# Patient Record
Sex: FEMALE | Race: WHITE | NOT HISPANIC OR LATINO | Employment: OTHER | URBAN - METROPOLITAN AREA
[De-identification: names, ages, dates, MRNs, and addresses within clinical notes are randomized per-mention and may not be internally consistent; named-entity substitution may affect disease eponyms.]

---

## 2017-02-22 ENCOUNTER — OFFICE VISIT - HEALTHEAST (OUTPATIENT)
Dept: INTERNAL MEDICINE | Facility: CLINIC | Age: 72
End: 2017-02-22

## 2017-02-22 DIAGNOSIS — Z00.00 PREVENTATIVE HEALTH CARE: ICD-10-CM

## 2017-02-22 DIAGNOSIS — J32.9 SINUSITIS: ICD-10-CM

## 2017-02-24 ENCOUNTER — RECORDS - HEALTHEAST (OUTPATIENT)
Dept: ADMINISTRATIVE | Facility: OTHER | Age: 72
End: 2017-02-24

## 2017-03-31 ENCOUNTER — OFFICE VISIT - HEALTHEAST (OUTPATIENT)
Dept: INTERNAL MEDICINE | Facility: CLINIC | Age: 72
End: 2017-03-31

## 2017-03-31 DIAGNOSIS — M89.9 DISORDER OF BONE AND CARTILAGE: ICD-10-CM

## 2017-03-31 DIAGNOSIS — E78.00 HYPERCHOLESTEROLEMIA: ICD-10-CM

## 2017-03-31 DIAGNOSIS — M94.9 DISORDER OF BONE AND CARTILAGE: ICD-10-CM

## 2017-03-31 DIAGNOSIS — Z00.00 PREVENTATIVE HEALTH CARE: ICD-10-CM

## 2017-03-31 DIAGNOSIS — C92.10 CHRONIC MYELOID LEUKEMIA (H): ICD-10-CM

## 2017-03-31 LAB
CHOLEST SERPL-MCNC: 195 MG/DL
FASTING STATUS PATIENT QL REPORTED: YES
HDLC SERPL-MCNC: 70 MG/DL
LDLC SERPL CALC-MCNC: 100 MG/DL
TRIGL SERPL-MCNC: 126 MG/DL

## 2017-03-31 ASSESSMENT — MIFFLIN-ST. JEOR: SCORE: 972.72

## 2017-04-03 ENCOUNTER — COMMUNICATION - HEALTHEAST (OUTPATIENT)
Dept: INTERNAL MEDICINE | Facility: CLINIC | Age: 72
End: 2017-04-03

## 2017-04-05 ENCOUNTER — RECORDS - HEALTHEAST (OUTPATIENT)
Dept: ADMINISTRATIVE | Facility: OTHER | Age: 72
End: 2017-04-05

## 2017-04-05 ENCOUNTER — RECORDS - HEALTHEAST (OUTPATIENT)
Dept: BONE DENSITY | Facility: CLINIC | Age: 72
End: 2017-04-05

## 2017-04-05 DIAGNOSIS — Z78.0 ASYMPTOMATIC MENOPAUSAL STATE: ICD-10-CM

## 2017-04-05 DIAGNOSIS — Z13.820 ENCOUNTER FOR SCREENING FOR OSTEOPOROSIS: ICD-10-CM

## 2017-04-12 ENCOUNTER — COMMUNICATION - HEALTHEAST (OUTPATIENT)
Dept: INTERNAL MEDICINE | Facility: CLINIC | Age: 72
End: 2017-04-12

## 2017-05-08 ENCOUNTER — COMMUNICATION - HEALTHEAST (OUTPATIENT)
Dept: INTERNAL MEDICINE | Facility: CLINIC | Age: 72
End: 2017-05-08

## 2017-05-19 ENCOUNTER — COMMUNICATION - HEALTHEAST (OUTPATIENT)
Dept: INTERNAL MEDICINE | Facility: CLINIC | Age: 72
End: 2017-05-19

## 2017-05-23 ENCOUNTER — AMBULATORY - HEALTHEAST (OUTPATIENT)
Dept: INTERNAL MEDICINE | Facility: CLINIC | Age: 72
End: 2017-05-23

## 2017-05-23 DIAGNOSIS — E01.0 THYROMEGALY: ICD-10-CM

## 2017-05-25 ENCOUNTER — HOSPITAL ENCOUNTER (OUTPATIENT)
Dept: ULTRASOUND IMAGING | Facility: CLINIC | Age: 72
Discharge: HOME | End: 2017-05-25
Attending: INTERNAL MEDICINE

## 2017-05-25 DIAGNOSIS — E01.0 THYROMEGALY: ICD-10-CM

## 2017-05-25 DIAGNOSIS — E04.9 NONTOXIC GOITER, UNSPECIFIED: ICD-10-CM

## 2017-06-08 ENCOUNTER — HOSPITAL ENCOUNTER (OUTPATIENT)
Dept: ULTRASOUND IMAGING | Facility: CLINIC | Age: 72
Discharge: HOME | End: 2017-06-08
Attending: INTERNAL MEDICINE

## 2017-06-08 DIAGNOSIS — E04.1 THYROID NODULE: ICD-10-CM

## 2017-06-09 ENCOUNTER — COMMUNICATION - HEALTHEAST (OUTPATIENT)
Dept: INTERNAL MEDICINE | Facility: CLINIC | Age: 72
End: 2017-06-09

## 2017-06-09 LAB
LAB AP CHARGES (HE HISTORICAL CONVERSION): NORMAL
LAB AP INITIAL CYTO EVAL (HE HISTORICAL CONVERSION): NORMAL
LAB MED GENERAL PATH INTERP (HE HISTORICAL CONVERSION): NORMAL
PATH REPORT.COMMENTS IMP SPEC: NORMAL
PATH REPORT.FINAL DX SPEC: NORMAL
PATH REPORT.MICROSCOPIC SPEC OTHER STN: NORMAL
PATH REPORT.RELEVANT HX SPEC: NORMAL
SPECIMEN DESCRIPTION: NORMAL

## 2017-11-15 ENCOUNTER — AMBULATORY - HEALTHEAST (OUTPATIENT)
Dept: NURSING | Facility: CLINIC | Age: 72
End: 2017-11-15

## 2017-11-15 DIAGNOSIS — Z23 NEEDS FLU SHOT: ICD-10-CM

## 2017-12-06 ENCOUNTER — RECORDS - HEALTHEAST (OUTPATIENT)
Dept: ADMINISTRATIVE | Facility: OTHER | Age: 72
End: 2017-12-06

## 2018-02-15 ENCOUNTER — AMBULATORY - HEALTHEAST (OUTPATIENT)
Dept: NEUROSURGERY | Facility: CLINIC | Age: 73
End: 2018-02-15

## 2018-02-15 ENCOUNTER — COMMUNICATION - HEALTHEAST (OUTPATIENT)
Dept: NEUROSURGERY | Facility: CLINIC | Age: 73
End: 2018-02-15

## 2018-02-15 DIAGNOSIS — D32.9 MENINGIOMA (H): ICD-10-CM

## 2018-02-19 ENCOUNTER — COMMUNICATION - HEALTHEAST (OUTPATIENT)
Dept: INTERNAL MEDICINE | Facility: CLINIC | Age: 73
End: 2018-02-19

## 2018-02-19 DIAGNOSIS — Z00.00 PREVENTATIVE HEALTH CARE: ICD-10-CM

## 2018-02-19 DIAGNOSIS — Z12.31 ENCOUNTER FOR SCREENING MAMMOGRAM FOR MALIGNANT NEOPLASM OF BREAST: ICD-10-CM

## 2018-02-22 ENCOUNTER — RECORDS - HEALTHEAST (OUTPATIENT)
Dept: ADMINISTRATIVE | Facility: OTHER | Age: 73
End: 2018-02-22

## 2018-02-26 ENCOUNTER — RECORDS - HEALTHEAST (OUTPATIENT)
Dept: ADMINISTRATIVE | Facility: OTHER | Age: 73
End: 2018-02-26

## 2018-03-07 ENCOUNTER — COMMUNICATION - HEALTHEAST (OUTPATIENT)
Dept: NEUROSURGERY | Facility: CLINIC | Age: 73
End: 2018-03-07

## 2018-03-07 DIAGNOSIS — D32.9 MENINGIOMA (H): ICD-10-CM

## 2018-03-21 ENCOUNTER — COMMUNICATION - HEALTHEAST (OUTPATIENT)
Dept: INTERNAL MEDICINE | Facility: CLINIC | Age: 73
End: 2018-03-21

## 2018-03-21 DIAGNOSIS — E55.9 VITAMIN D DEFICIENCY: ICD-10-CM

## 2018-03-21 DIAGNOSIS — Z00.00 PREVENTATIVE HEALTH CARE: ICD-10-CM

## 2018-03-21 DIAGNOSIS — C92.10: ICD-10-CM

## 2018-03-22 ENCOUNTER — RECORDS - HEALTHEAST (OUTPATIENT)
Dept: ADMINISTRATIVE | Facility: OTHER | Age: 73
End: 2018-03-22

## 2018-03-26 ENCOUNTER — AMBULATORY - HEALTHEAST (OUTPATIENT)
Dept: LAB | Facility: CLINIC | Age: 73
End: 2018-03-26

## 2018-03-26 DIAGNOSIS — C92.10: ICD-10-CM

## 2018-03-26 DIAGNOSIS — Z00.00 PREVENTATIVE HEALTH CARE: ICD-10-CM

## 2018-03-26 DIAGNOSIS — E55.9 VITAMIN D DEFICIENCY: ICD-10-CM

## 2018-03-26 LAB
ALBUMIN SERPL-MCNC: 3.8 G/DL (ref 3.5–5)
ALP SERPL-CCNC: 53 U/L (ref 45–120)
ALT SERPL W P-5'-P-CCNC: 15 U/L (ref 0–45)
ANION GAP SERPL CALCULATED.3IONS-SCNC: 10 MMOL/L (ref 5–18)
AST SERPL W P-5'-P-CCNC: 24 U/L (ref 0–40)
BASOPHILS # BLD AUTO: 0 THOU/UL (ref 0–0.2)
BASOPHILS NFR BLD AUTO: 1 % (ref 0–2)
BILIRUB SERPL-MCNC: 0.7 MG/DL (ref 0–1)
BUN SERPL-MCNC: 12 MG/DL (ref 8–28)
CALCIUM SERPL-MCNC: 9.2 MG/DL (ref 8.5–10.5)
CHLORIDE BLD-SCNC: 107 MMOL/L (ref 98–107)
CHOLEST SERPL-MCNC: 211 MG/DL
CO2 SERPL-SCNC: 25 MMOL/L (ref 22–31)
CREAT SERPL-MCNC: 0.94 MG/DL (ref 0.6–1.1)
EOSINOPHIL # BLD AUTO: 0.2 THOU/UL (ref 0–0.4)
EOSINOPHIL NFR BLD AUTO: 5 % (ref 0–6)
ERYTHROCYTE [DISTWIDTH] IN BLOOD BY AUTOMATED COUNT: 11.7 % (ref 11–14.5)
FASTING STATUS PATIENT QL REPORTED: YES
GFR SERPL CREATININE-BSD FRML MDRD: 59 ML/MIN/1.73M2
GLUCOSE BLD-MCNC: 104 MG/DL (ref 70–125)
HCT VFR BLD AUTO: 38.3 % (ref 35–47)
HDLC SERPL-MCNC: 72 MG/DL
HGB BLD-MCNC: 12.6 G/DL (ref 12–16)
LDLC SERPL CALC-MCNC: 120 MG/DL
LYMPHOCYTES # BLD AUTO: 1.5 THOU/UL (ref 0.8–4.4)
LYMPHOCYTES NFR BLD AUTO: 32 % (ref 20–40)
MCH RBC QN AUTO: 31.7 PG (ref 27–34)
MCHC RBC AUTO-ENTMCNC: 32.9 G/DL (ref 32–36)
MCV RBC AUTO: 96 FL (ref 80–100)
MONOCYTES # BLD AUTO: 0.4 THOU/UL (ref 0–0.9)
MONOCYTES NFR BLD AUTO: 9 % (ref 2–10)
NEUTROPHILS # BLD AUTO: 2.4 THOU/UL (ref 2–7.7)
NEUTROPHILS NFR BLD AUTO: 53 % (ref 50–70)
PLATELET # BLD AUTO: 240 THOU/UL (ref 140–440)
PMV BLD AUTO: 7.4 FL (ref 7–10)
POTASSIUM BLD-SCNC: 4.8 MMOL/L (ref 3.5–5)
PROT SERPL-MCNC: 6.3 G/DL (ref 6–8)
RBC # BLD AUTO: 3.98 MILL/UL (ref 3.8–5.4)
SODIUM SERPL-SCNC: 142 MMOL/L (ref 136–145)
TRIGL SERPL-MCNC: 95 MG/DL
WBC: 4.6 THOU/UL (ref 4–11)

## 2018-03-27 LAB
25(OH)D3 SERPL-MCNC: 47.1 NG/ML (ref 30–80)
25(OH)D3 SERPL-MCNC: 47.1 NG/ML (ref 30–80)

## 2018-04-11 ENCOUNTER — COMMUNICATION - HEALTHEAST (OUTPATIENT)
Dept: INTERNAL MEDICINE | Facility: CLINIC | Age: 73
End: 2018-04-11

## 2018-04-11 ENCOUNTER — OFFICE VISIT - HEALTHEAST (OUTPATIENT)
Dept: INTERNAL MEDICINE | Facility: CLINIC | Age: 73
End: 2018-04-11

## 2018-04-11 DIAGNOSIS — C92.10 CHRONIC MYELOID LEUKEMIA (H): ICD-10-CM

## 2018-04-11 DIAGNOSIS — Z00.00 PREVENTATIVE HEALTH CARE: ICD-10-CM

## 2018-04-11 DIAGNOSIS — D12.6 BENIGN NEOPLASM OF COLON: ICD-10-CM

## 2018-04-11 DIAGNOSIS — M89.9 DISORDER OF BONE AND CARTILAGE: ICD-10-CM

## 2018-04-11 DIAGNOSIS — M94.9 DISORDER OF BONE AND CARTILAGE: ICD-10-CM

## 2018-04-11 DIAGNOSIS — E04.2 MULTIPLE THYROID NODULES: ICD-10-CM

## 2018-04-11 DIAGNOSIS — C50.919 MALIGNANT NEOPLASM OF FEMALE BREAST (H): ICD-10-CM

## 2018-04-11 ASSESSMENT — MIFFLIN-ST. JEOR: SCORE: 979.53

## 2018-04-18 ENCOUNTER — HOSPITAL ENCOUNTER (OUTPATIENT)
Dept: ULTRASOUND IMAGING | Facility: CLINIC | Age: 73
Discharge: HOME | End: 2018-04-18
Attending: INTERNAL MEDICINE

## 2018-04-18 DIAGNOSIS — E04.2 MULTIPLE THYROID NODULES: ICD-10-CM

## 2018-05-20 ENCOUNTER — COMMUNICATION - HEALTHEAST (OUTPATIENT)
Dept: INTERNAL MEDICINE | Facility: CLINIC | Age: 73
End: 2018-05-20

## 2018-08-22 ENCOUNTER — COMMUNICATION - HEALTHEAST (OUTPATIENT)
Dept: INTERNAL MEDICINE | Facility: CLINIC | Age: 73
End: 2018-08-22

## 2018-08-22 ENCOUNTER — OFFICE VISIT - HEALTHEAST (OUTPATIENT)
Dept: INTERNAL MEDICINE | Facility: CLINIC | Age: 73
End: 2018-08-22

## 2018-08-22 DIAGNOSIS — N89.8 VAGINAL IRRITATION: ICD-10-CM

## 2018-08-22 DIAGNOSIS — N95.2 POSTMENOPAUSAL ATROPHIC VAGINITIS: ICD-10-CM

## 2018-08-22 LAB
ALBUMIN UR-MCNC: NEGATIVE MG/DL
APPEARANCE UR: CLEAR
BILIRUB UR QL STRIP: NEGATIVE
CLUE CELLS: NORMAL
COLOR UR AUTO: YELLOW
GLUCOSE UR STRIP-MCNC: NEGATIVE MG/DL
HGB UR QL STRIP: NEGATIVE
KETONES UR STRIP-MCNC: ABNORMAL MG/DL
LEUKOCYTE ESTERASE UR QL STRIP: NEGATIVE
NITRATE UR QL: NEGATIVE
PH UR STRIP: 6.5 [PH] (ref 5–8)
SP GR UR STRIP: 1.02 (ref 1–1.03)
TRICHOMONAS, WET PREP: NORMAL
UROBILINOGEN UR STRIP-ACNC: ABNORMAL
YEAST, WET PREP: NORMAL

## 2018-09-19 ENCOUNTER — COMMUNICATION - HEALTHEAST (OUTPATIENT)
Dept: INTERNAL MEDICINE | Facility: CLINIC | Age: 73
End: 2018-09-19

## 2018-10-01 ENCOUNTER — OFFICE VISIT - HEALTHEAST (OUTPATIENT)
Dept: FAMILY MEDICINE | Facility: CLINIC | Age: 73
End: 2018-10-01

## 2018-10-01 ENCOUNTER — COMMUNICATION - HEALTHEAST (OUTPATIENT)
Dept: SCHEDULING | Facility: CLINIC | Age: 73
End: 2018-10-01

## 2018-10-01 DIAGNOSIS — R42 DIZZINESS: ICD-10-CM

## 2018-10-01 LAB
ANION GAP SERPL CALCULATED.3IONS-SCNC: 10 MMOL/L (ref 5–18)
BASOPHILS # BLD AUTO: 0 THOU/UL (ref 0–0.2)
BASOPHILS NFR BLD AUTO: 1 % (ref 0–2)
BUN SERPL-MCNC: 13 MG/DL (ref 8–28)
CHLORIDE BLD-SCNC: 103 MMOL/L (ref 98–107)
CO2 SERPL-SCNC: 27 MMOL/L (ref 22–31)
CREAT SERPL-MCNC: 1 MG/DL (ref 0.7–1.3)
EOSINOPHIL # BLD AUTO: 0.3 THOU/UL (ref 0–0.4)
EOSINOPHIL NFR BLD AUTO: 4 % (ref 0–6)
ERYTHROCYTE [DISTWIDTH] IN BLOOD BY AUTOMATED COUNT: 12.4 % (ref 11–14.5)
GLUCOSE BLD-MCNC: 103 MG/DL (ref 70–125)
HCT VFR BLD AUTO: 35.7 % (ref 35–47)
HGB BLD-MCNC: 11.8 G/DL (ref 12–16)
LYMPHOCYTES # BLD AUTO: 1.8 THOU/UL (ref 0.8–4.4)
LYMPHOCYTES NFR BLD AUTO: 29 % (ref 20–40)
MCH RBC QN AUTO: 31.5 PG (ref 27–34)
MCHC RBC AUTO-ENTMCNC: 33 G/DL (ref 32–36)
MCV RBC AUTO: 95 FL (ref 80–100)
MONOCYTES # BLD AUTO: 0.5 THOU/UL (ref 0–0.9)
MONOCYTES NFR BLD AUTO: 8 % (ref 2–10)
NEUTROPHILS # BLD AUTO: 3.8 THOU/UL (ref 2–7.7)
NEUTROPHILS NFR BLD AUTO: 60 % (ref 50–70)
PLATELET # BLD AUTO: 258 THOU/UL (ref 140–440)
PMV BLD AUTO: 7.6 FL (ref 7–10)
POTASSIUM BLD-SCNC: 4.6 MMOL/L (ref 3.5–5.5)
RBC # BLD AUTO: 3.74 MILL/UL (ref 3.8–5.4)
SODIUM SERPL-SCNC: 140 MMOL/L (ref 136–145)
WBC: 6.4 THOU/UL (ref 4–11)

## 2018-10-02 ENCOUNTER — COMMUNICATION - HEALTHEAST (OUTPATIENT)
Dept: NEUROSURGERY | Facility: CLINIC | Age: 73
End: 2018-10-02

## 2018-10-08 ENCOUNTER — AMBULATORY - HEALTHEAST (OUTPATIENT)
Dept: INTERNAL MEDICINE | Facility: CLINIC | Age: 73
End: 2018-10-08

## 2018-10-08 ENCOUNTER — COMMUNICATION - HEALTHEAST (OUTPATIENT)
Dept: INTERNAL MEDICINE | Facility: CLINIC | Age: 73
End: 2018-10-08

## 2018-10-08 DIAGNOSIS — D32.9 MENINGIOMA (H): ICD-10-CM

## 2018-10-08 DIAGNOSIS — R42 VERTIGO: ICD-10-CM

## 2018-10-08 DIAGNOSIS — R42 POSTURAL DIZZINESS: ICD-10-CM

## 2018-10-18 ENCOUNTER — RECORDS - HEALTHEAST (OUTPATIENT)
Dept: ADMINISTRATIVE | Facility: OTHER | Age: 73
End: 2018-10-18

## 2018-10-27 ENCOUNTER — COMMUNICATION - HEALTHEAST (OUTPATIENT)
Dept: INTERNAL MEDICINE | Facility: CLINIC | Age: 73
End: 2018-10-27

## 2018-10-30 ENCOUNTER — AMBULATORY - HEALTHEAST (OUTPATIENT)
Dept: INTERNAL MEDICINE | Facility: CLINIC | Age: 73
End: 2018-10-30

## 2018-10-30 DIAGNOSIS — R42 VERTIGO: ICD-10-CM

## 2018-12-12 ENCOUNTER — RECORDS - HEALTHEAST (OUTPATIENT)
Dept: ADMINISTRATIVE | Facility: OTHER | Age: 73
End: 2018-12-12

## 2018-12-13 ENCOUNTER — COMMUNICATION - HEALTHEAST (OUTPATIENT)
Dept: INTERNAL MEDICINE | Facility: CLINIC | Age: 73
End: 2018-12-13

## 2019-02-02 ENCOUNTER — COMMUNICATION - HEALTHEAST (OUTPATIENT)
Dept: INTERNAL MEDICINE | Facility: CLINIC | Age: 74
End: 2019-02-02

## 2019-04-04 ENCOUNTER — RECORDS - HEALTHEAST (OUTPATIENT)
Dept: ADMINISTRATIVE | Facility: OTHER | Age: 74
End: 2019-04-04

## 2019-04-09 ENCOUNTER — RECORDS - HEALTHEAST (OUTPATIENT)
Dept: ADMINISTRATIVE | Facility: OTHER | Age: 74
End: 2019-04-09

## 2019-04-22 ENCOUNTER — AMBULATORY - HEALTHEAST (OUTPATIENT)
Dept: INTERNAL MEDICINE | Facility: CLINIC | Age: 74
End: 2019-04-22

## 2019-04-22 DIAGNOSIS — E78.5 DYSLIPIDEMIA: ICD-10-CM

## 2019-04-22 DIAGNOSIS — Z71.89 COUNSELING ON HEALTH PROMOTION AND DISEASE PREVENTION: ICD-10-CM

## 2019-05-10 ENCOUNTER — OFFICE VISIT - HEALTHEAST (OUTPATIENT)
Dept: INTERNAL MEDICINE | Facility: CLINIC | Age: 74
End: 2019-05-10

## 2019-05-10 DIAGNOSIS — C92.10 CHRONIC MYELOID LEUKEMIA (H): ICD-10-CM

## 2019-05-10 DIAGNOSIS — Z00.00 PREVENTATIVE HEALTH CARE: ICD-10-CM

## 2019-05-10 DIAGNOSIS — M94.9 DISORDER OF BONE AND CARTILAGE: ICD-10-CM

## 2019-05-10 DIAGNOSIS — M89.9 DISORDER OF BONE AND CARTILAGE: ICD-10-CM

## 2019-05-10 DIAGNOSIS — I65.23 MILD ATHEROSCLEROSIS OF CAROTID ARTERY, BILATERAL: ICD-10-CM

## 2019-05-10 LAB
ALBUMIN SERPL-MCNC: 4.3 G/DL (ref 3.5–5)
ALP SERPL-CCNC: 76 U/L (ref 45–120)
ALT SERPL W P-5'-P-CCNC: 12 U/L (ref 0–45)
ANION GAP SERPL CALCULATED.3IONS-SCNC: 12 MMOL/L (ref 5–18)
AST SERPL W P-5'-P-CCNC: 21 U/L (ref 0–40)
BASOPHILS # BLD AUTO: 0 THOU/UL (ref 0–0.2)
BASOPHILS NFR BLD AUTO: 0 % (ref 0–2)
BILIRUB SERPL-MCNC: 0.5 MG/DL (ref 0–1)
BUN SERPL-MCNC: 16 MG/DL (ref 8–28)
CALCIUM SERPL-MCNC: 10.3 MG/DL (ref 8.5–10.5)
CHLORIDE BLD-SCNC: 104 MMOL/L (ref 98–107)
CHOLEST SERPL-MCNC: 251 MG/DL
CO2 SERPL-SCNC: 26 MMOL/L (ref 22–31)
CREAT SERPL-MCNC: 0.94 MG/DL (ref 0.6–1.1)
EOSINOPHIL # BLD AUTO: 0.1 THOU/UL (ref 0–0.4)
EOSINOPHIL NFR BLD AUTO: 2 % (ref 0–6)
ERYTHROCYTE [DISTWIDTH] IN BLOOD BY AUTOMATED COUNT: 11.3 % (ref 11–14.5)
FASTING STATUS PATIENT QL REPORTED: NO
GFR SERPL CREATININE-BSD FRML MDRD: 58 ML/MIN/1.73M2
GLUCOSE BLD-MCNC: 97 MG/DL (ref 70–125)
HCT VFR BLD AUTO: 41.3 % (ref 35–47)
HDLC SERPL-MCNC: 77 MG/DL
HGB BLD-MCNC: 13.8 G/DL (ref 12–16)
LDLC SERPL CALC-MCNC: 149 MG/DL
LYMPHOCYTES # BLD AUTO: 1.4 THOU/UL (ref 0.8–4.4)
LYMPHOCYTES NFR BLD AUTO: 24 % (ref 20–40)
MCH RBC QN AUTO: 30.6 PG (ref 27–34)
MCHC RBC AUTO-ENTMCNC: 33.5 G/DL (ref 32–36)
MCV RBC AUTO: 91 FL (ref 80–100)
MONOCYTES # BLD AUTO: 0.4 THOU/UL (ref 0–0.9)
MONOCYTES NFR BLD AUTO: 6 % (ref 2–10)
NEUTROPHILS # BLD AUTO: 4.1 THOU/UL (ref 2–7.7)
NEUTROPHILS NFR BLD AUTO: 68 % (ref 50–70)
PLATELET # BLD AUTO: 304 THOU/UL (ref 140–440)
PMV BLD AUTO: 7.6 FL (ref 7–10)
POTASSIUM BLD-SCNC: 4.4 MMOL/L (ref 3.5–5)
PROT SERPL-MCNC: 6.9 G/DL (ref 6–8)
RBC # BLD AUTO: 4.53 MILL/UL (ref 3.8–5.4)
SODIUM SERPL-SCNC: 142 MMOL/L (ref 136–145)
TRIGL SERPL-MCNC: 126 MG/DL
WBC: 6 THOU/UL (ref 4–11)

## 2019-05-10 ASSESSMENT — MIFFLIN-ST. JEOR: SCORE: 952.31

## 2019-05-15 LAB — MEV IGG SER IA-ACNC: POSITIVE

## 2019-07-25 ENCOUNTER — COMMUNICATION - HEALTHEAST (OUTPATIENT)
Dept: INTERNAL MEDICINE | Facility: CLINIC | Age: 74
End: 2019-07-25

## 2019-08-15 ENCOUNTER — RECORDS - HEALTHEAST (OUTPATIENT)
Dept: ADMINISTRATIVE | Facility: OTHER | Age: 74
End: 2019-08-15

## 2019-08-15 ENCOUNTER — COMMUNICATION - HEALTHEAST (OUTPATIENT)
Dept: INTERNAL MEDICINE | Facility: CLINIC | Age: 74
End: 2019-08-15

## 2019-08-15 ENCOUNTER — RECORDS - HEALTHEAST (OUTPATIENT)
Dept: BONE DENSITY | Facility: CLINIC | Age: 74
End: 2019-08-15

## 2019-08-15 ENCOUNTER — AMBULATORY - HEALTHEAST (OUTPATIENT)
Dept: LAB | Facility: CLINIC | Age: 74
End: 2019-08-15

## 2019-08-15 DIAGNOSIS — Z71.89 COUNSELING ON HEALTH PROMOTION AND DISEASE PREVENTION: ICD-10-CM

## 2019-08-15 DIAGNOSIS — E78.5 DYSLIPIDEMIA: ICD-10-CM

## 2019-08-15 DIAGNOSIS — M89.9 DISORDER OF BONE, UNSPECIFIED: ICD-10-CM

## 2019-08-15 DIAGNOSIS — M94.9 DISORDER OF CARTILAGE, UNSPECIFIED: ICD-10-CM

## 2019-08-15 LAB
ALBUMIN SERPL-MCNC: 4 G/DL (ref 3.5–5)
ALP SERPL-CCNC: 83 U/L (ref 45–120)
ALT SERPL W P-5'-P-CCNC: 13 U/L (ref 0–45)
ANION GAP SERPL CALCULATED.3IONS-SCNC: 7 MMOL/L (ref 5–18)
AST SERPL W P-5'-P-CCNC: 19 U/L (ref 0–40)
BILIRUB SERPL-MCNC: 0.5 MG/DL (ref 0–1)
BUN SERPL-MCNC: 14 MG/DL (ref 8–28)
CALCIUM SERPL-MCNC: 10.1 MG/DL (ref 8.5–10.5)
CHLORIDE BLD-SCNC: 107 MMOL/L (ref 98–107)
CHOLEST SERPL-MCNC: 169 MG/DL
CO2 SERPL-SCNC: 28 MMOL/L (ref 22–31)
CREAT SERPL-MCNC: 0.82 MG/DL (ref 0.6–1.1)
ERYTHROCYTE [DISTWIDTH] IN BLOOD BY AUTOMATED COUNT: 11.8 % (ref 11–14.5)
FASTING STATUS PATIENT QL REPORTED: YES
GFR SERPL CREATININE-BSD FRML MDRD: >60 ML/MIN/1.73M2
GLUCOSE BLD-MCNC: 98 MG/DL (ref 70–125)
HCT VFR BLD AUTO: 38.6 % (ref 35–47)
HDLC SERPL-MCNC: 75 MG/DL
HGB BLD-MCNC: 12.9 G/DL (ref 12–16)
LDLC SERPL CALC-MCNC: 77 MG/DL
MCH RBC QN AUTO: 30.3 PG (ref 27–34)
MCHC RBC AUTO-ENTMCNC: 33.3 G/DL (ref 32–36)
MCV RBC AUTO: 91 FL (ref 80–100)
PLATELET # BLD AUTO: 282 THOU/UL (ref 140–440)
PMV BLD AUTO: 7.7 FL (ref 7–10)
POTASSIUM BLD-SCNC: 4.2 MMOL/L (ref 3.5–5)
PROT SERPL-MCNC: 6.4 G/DL (ref 6–8)
RBC # BLD AUTO: 4.24 MILL/UL (ref 3.8–5.4)
SODIUM SERPL-SCNC: 142 MMOL/L (ref 136–145)
TRIGL SERPL-MCNC: 87 MG/DL
WBC: 5.6 THOU/UL (ref 4–11)

## 2019-09-10 ENCOUNTER — COMMUNICATION - HEALTHEAST (OUTPATIENT)
Dept: INTERNAL MEDICINE | Facility: CLINIC | Age: 74
End: 2019-09-10

## 2019-09-10 DIAGNOSIS — R42 VERTIGO: ICD-10-CM

## 2019-10-25 ENCOUNTER — COMMUNICATION - HEALTHEAST (OUTPATIENT)
Dept: INTERNAL MEDICINE | Facility: CLINIC | Age: 74
End: 2019-10-25

## 2019-11-14 ENCOUNTER — RECORDS - HEALTHEAST (OUTPATIENT)
Dept: ADMINISTRATIVE | Facility: OTHER | Age: 74
End: 2019-11-14

## 2019-11-25 ENCOUNTER — COMMUNICATION - HEALTHEAST (OUTPATIENT)
Dept: INTERNAL MEDICINE | Facility: CLINIC | Age: 74
End: 2019-11-25

## 2019-11-26 ENCOUNTER — COMMUNICATION - HEALTHEAST (OUTPATIENT)
Dept: INTERNAL MEDICINE | Facility: CLINIC | Age: 74
End: 2019-11-26

## 2020-01-15 ENCOUNTER — RECORDS - HEALTHEAST (OUTPATIENT)
Dept: ADMINISTRATIVE | Facility: OTHER | Age: 75
End: 2020-01-15

## 2020-03-04 ENCOUNTER — COMMUNICATION - HEALTHEAST (OUTPATIENT)
Dept: INTERNAL MEDICINE | Facility: CLINIC | Age: 75
End: 2020-03-04

## 2020-04-01 ENCOUNTER — COMMUNICATION - HEALTHEAST (OUTPATIENT)
Dept: INTERNAL MEDICINE | Facility: CLINIC | Age: 75
End: 2020-04-01

## 2020-04-01 DIAGNOSIS — Z00.00 PREVENTATIVE HEALTH CARE: ICD-10-CM

## 2020-05-14 ENCOUNTER — RECORDS - HEALTHEAST (OUTPATIENT)
Dept: ADMINISTRATIVE | Facility: OTHER | Age: 75
End: 2020-05-14

## 2020-07-19 ENCOUNTER — COMMUNICATION - HEALTHEAST (OUTPATIENT)
Dept: INTERNAL MEDICINE | Facility: CLINIC | Age: 75
End: 2020-07-19

## 2020-07-19 DIAGNOSIS — Z00.00 PREVENTATIVE HEALTH CARE: ICD-10-CM

## 2020-07-20 RX ORDER — ATORVASTATIN CALCIUM 10 MG/1
TABLET, FILM COATED ORAL
Qty: 90 TABLET | Refills: 3 | Status: SHIPPED | OUTPATIENT
Start: 2020-07-20 | End: 2022-01-21

## 2020-08-04 ENCOUNTER — COMMUNICATION - HEALTHEAST (OUTPATIENT)
Dept: LAB | Facility: CLINIC | Age: 75
End: 2020-08-04

## 2020-08-04 DIAGNOSIS — M89.9 DISORDER OF BONE AND CARTILAGE: ICD-10-CM

## 2020-08-04 DIAGNOSIS — Z13.220 SCREENING FOR HYPERLIPIDEMIA: ICD-10-CM

## 2020-08-04 DIAGNOSIS — Z00.00 ANNUAL PHYSICAL EXAM: ICD-10-CM

## 2020-08-04 DIAGNOSIS — Z13.21 ENCOUNTER FOR VITAMIN DEFICIENCY SCREENING: ICD-10-CM

## 2020-08-04 DIAGNOSIS — M94.9 DISORDER OF BONE AND CARTILAGE: ICD-10-CM

## 2020-08-19 ENCOUNTER — AMBULATORY - HEALTHEAST (OUTPATIENT)
Dept: LAB | Facility: CLINIC | Age: 75
End: 2020-08-19

## 2020-08-19 DIAGNOSIS — M94.9 DISORDER OF BONE AND CARTILAGE: ICD-10-CM

## 2020-08-19 DIAGNOSIS — M89.9 DISORDER OF BONE AND CARTILAGE: ICD-10-CM

## 2020-08-19 DIAGNOSIS — Z13.21 ENCOUNTER FOR VITAMIN DEFICIENCY SCREENING: ICD-10-CM

## 2020-08-19 DIAGNOSIS — Z13.220 SCREENING FOR HYPERLIPIDEMIA: ICD-10-CM

## 2020-08-19 DIAGNOSIS — Z00.00 ANNUAL PHYSICAL EXAM: ICD-10-CM

## 2020-08-19 LAB
ALBUMIN SERPL-MCNC: 4 G/DL (ref 3.5–5)
ALP SERPL-CCNC: 71 U/L (ref 45–120)
ALT SERPL W P-5'-P-CCNC: 17 U/L (ref 0–45)
ANION GAP SERPL CALCULATED.3IONS-SCNC: 7 MMOL/L (ref 5–18)
AST SERPL W P-5'-P-CCNC: 21 U/L (ref 0–40)
BILIRUB SERPL-MCNC: 0.8 MG/DL (ref 0–1)
BUN SERPL-MCNC: 14 MG/DL (ref 8–28)
CALCIUM SERPL-MCNC: 9.5 MG/DL (ref 8.5–10.5)
CHLORIDE BLD-SCNC: 107 MMOL/L (ref 98–107)
CHOLEST SERPL-MCNC: 182 MG/DL
CO2 SERPL-SCNC: 28 MMOL/L (ref 22–31)
CREAT SERPL-MCNC: 0.86 MG/DL (ref 0.6–1.1)
ERYTHROCYTE [DISTWIDTH] IN BLOOD BY AUTOMATED COUNT: 11.7 % (ref 11–14.5)
FASTING STATUS PATIENT QL REPORTED: YES
GFR SERPL CREATININE-BSD FRML MDRD: >60 ML/MIN/1.73M2
GLUCOSE BLD-MCNC: 106 MG/DL (ref 70–125)
HCT VFR BLD AUTO: 41.8 % (ref 35–47)
HDLC SERPL-MCNC: 89 MG/DL
HGB BLD-MCNC: 13.9 G/DL (ref 12–16)
LDLC SERPL CALC-MCNC: 78 MG/DL
MCH RBC QN AUTO: 31.2 PG (ref 27–34)
MCHC RBC AUTO-ENTMCNC: 33.3 G/DL (ref 32–36)
MCV RBC AUTO: 94 FL (ref 80–100)
PLATELET # BLD AUTO: 269 THOU/UL (ref 140–440)
PMV BLD AUTO: 8.3 FL (ref 7–10)
POTASSIUM BLD-SCNC: 5 MMOL/L (ref 3.5–5)
PROT SERPL-MCNC: 6.4 G/DL (ref 6–8)
RBC # BLD AUTO: 4.46 MILL/UL (ref 3.8–5.4)
SODIUM SERPL-SCNC: 142 MMOL/L (ref 136–145)
TRIGL SERPL-MCNC: 75 MG/DL
WBC: 6.7 THOU/UL (ref 4–11)

## 2020-08-20 LAB
25(OH)D3 SERPL-MCNC: 43 NG/ML (ref 30–80)
25(OH)D3 SERPL-MCNC: 43 NG/ML (ref 30–80)

## 2020-08-21 ENCOUNTER — OFFICE VISIT - HEALTHEAST (OUTPATIENT)
Dept: INTERNAL MEDICINE | Facility: CLINIC | Age: 75
End: 2020-08-21

## 2020-08-21 DIAGNOSIS — Z00.00 ENCOUNTER FOR PREVENTIVE CARE: ICD-10-CM

## 2020-08-21 DIAGNOSIS — C92.10 CHRONIC MYELOID LEUKEMIA (H): ICD-10-CM

## 2020-08-21 DIAGNOSIS — I65.23 MILD ATHEROSCLEROSIS OF CAROTID ARTERY, BILATERAL: ICD-10-CM

## 2020-08-21 DIAGNOSIS — C50.912 MALIGNANT NEOPLASM OF LEFT FEMALE BREAST, UNSPECIFIED ESTROGEN RECEPTOR STATUS, UNSPECIFIED SITE OF BREAST (H): ICD-10-CM

## 2020-08-21 ASSESSMENT — MIFFLIN-ST. JEOR: SCORE: 940.4

## 2020-08-26 ENCOUNTER — COMMUNICATION - HEALTHEAST (OUTPATIENT)
Dept: INTERNAL MEDICINE | Facility: CLINIC | Age: 75
End: 2020-08-26

## 2020-08-26 DIAGNOSIS — F41.1 GENERALIZED ANXIETY DISORDER: ICD-10-CM

## 2020-08-27 ENCOUNTER — HOSPITAL ENCOUNTER (OUTPATIENT)
Dept: ULTRASOUND IMAGING | Facility: CLINIC | Age: 75
Discharge: HOME | End: 2020-08-27
Attending: INTERNAL MEDICINE

## 2020-08-27 DIAGNOSIS — I65.23 MILD ATHEROSCLEROSIS OF CAROTID ARTERY, BILATERAL: ICD-10-CM

## 2020-08-31 RX ORDER — CITALOPRAM HYDROBROMIDE 10 MG/1
TABLET ORAL
Qty: 30 TABLET | Refills: 2 | Status: SHIPPED | OUTPATIENT
Start: 2020-08-31 | End: 2022-01-21

## 2020-10-03 ENCOUNTER — AMBULATORY - HEALTHEAST (OUTPATIENT)
Dept: NURSING | Facility: CLINIC | Age: 75
End: 2020-10-03

## 2020-12-02 ENCOUNTER — COMMUNICATION - HEALTHEAST (OUTPATIENT)
Dept: INTERNAL MEDICINE | Facility: CLINIC | Age: 75
End: 2020-12-02

## 2021-01-07 ENCOUNTER — RECORDS - HEALTHEAST (OUTPATIENT)
Dept: ADMINISTRATIVE | Facility: OTHER | Age: 76
End: 2021-01-07

## 2021-01-18 ENCOUNTER — COMMUNICATION - HEALTHEAST (OUTPATIENT)
Dept: INTERNAL MEDICINE | Facility: CLINIC | Age: 76
End: 2021-01-18

## 2021-02-15 ENCOUNTER — AMBULATORY - HEALTHEAST (OUTPATIENT)
Dept: NURSING | Facility: CLINIC | Age: 76
End: 2021-02-15

## 2021-02-22 ENCOUNTER — COMMUNICATION - HEALTHEAST (OUTPATIENT)
Dept: INTERNAL MEDICINE | Facility: CLINIC | Age: 76
End: 2021-02-22

## 2021-03-08 ENCOUNTER — AMBULATORY - HEALTHEAST (OUTPATIENT)
Dept: NURSING | Facility: CLINIC | Age: 76
End: 2021-03-08

## 2021-04-08 ENCOUNTER — RECORDS - HEALTHEAST (OUTPATIENT)
Dept: ADMINISTRATIVE | Facility: OTHER | Age: 76
End: 2021-04-08

## 2021-05-14 ENCOUNTER — COMMUNICATION - HEALTHEAST (OUTPATIENT)
Dept: NEUROSURGERY | Facility: CLINIC | Age: 76
End: 2021-05-14

## 2021-05-14 DIAGNOSIS — D32.9 MENINGIOMA (H): ICD-10-CM

## 2021-05-24 ENCOUNTER — RECORDS - HEALTHEAST (OUTPATIENT)
Dept: ADMINISTRATIVE | Facility: CLINIC | Age: 76
End: 2021-05-24

## 2021-05-25 ENCOUNTER — RECORDS - HEALTHEAST (OUTPATIENT)
Dept: ADMINISTRATIVE | Facility: CLINIC | Age: 76
End: 2021-05-25

## 2021-05-26 ENCOUNTER — RECORDS - HEALTHEAST (OUTPATIENT)
Dept: ADMINISTRATIVE | Facility: CLINIC | Age: 76
End: 2021-05-26

## 2021-05-27 ENCOUNTER — RECORDS - HEALTHEAST (OUTPATIENT)
Dept: ADMINISTRATIVE | Facility: CLINIC | Age: 76
End: 2021-05-27

## 2021-05-28 ENCOUNTER — RECORDS - HEALTHEAST (OUTPATIENT)
Dept: ADMINISTRATIVE | Facility: CLINIC | Age: 76
End: 2021-05-28

## 2021-05-28 NOTE — PATIENT INSTRUCTIONS - HE
Advance Directive  Patient s advance directive was discussed and I am comfortable with the patient s wishes.  Patient Education   Personalized Prevention Plan  You are due for the preventive services outlined below.  Your care team is available to assist you in scheduling these services.  If you have already completed any of these items, please share that information with your care team to update in your medical record.  Health Maintenance   Topic Date Due     ZOSTER VACCINES (3 of 3) 01/07/2019     DXA SCAN  04/05/2019     FALL RISK ASSESSMENT  05/10/2020     MAMMOGRAM  04/09/2021     TD 18+ HE  10/24/2022     ADVANCE DIRECTIVES DISCUSSED WITH PATIENT  04/11/2023     COLONOSCOPY  03/31/2026     PNEUMOCOCCAL POLYSACCHARIDE VACCINE AGE 65 AND OVER  Completed     INFLUENZA VACCINE RULE BASED  Completed     PNEUMOCOCCAL CONJUGATE VACCINE FOR ADULTS (PCV13 OR PREVNAR)  Completed

## 2021-05-28 NOTE — PROGRESS NOTES
Assessment and Plan:   Annual wellness forms reviewed.  No new needs identified    1. Preventative health care  She is up-to-date with regard to mammography, due for bone density, immunizations are up-to-date.  Colon cancer screening up-to-date.  Ophthalmologic, dental and Derm care are up-to-date.  She is feeling much better now that she is off of her Gleevec.  She is exercising regularly.  Appetite improved.  Mucous membranes improved.  Labs as below  - atorvastatin (LIPITOR) 10 MG tablet; Take 1 tablet (10 mg total) by mouth daily.  Dispense: 90 tablet; Refill: 3  - Lipid [place]  - Comprehensive Metabolic Panel  - HM1(CBC and Differential)  - HM1 (CBC with Diff)  - Rubeola Antibody, IgG    2. Osteopenia  We will update bone density.  She has low body mass and has been on treatment for CML  - DXA Bone Density Scan; Future    3. Mild atherosclerosis of carotid artery, bilateral  Carotid ultrasound reviewed.  Follow-up scheduled.  Have discussed the possibility of beginning cholesterol medication due to atherosclerosis.  LDL cholesterol at last visit was 120.  She would benefit from LDL lowering to under 120.  Recommendations: Prescription for atorvastatin prescribed.  She should follow-up in the future for lipid and liver enzymes    4. Chronic myeloid leukemia (H)  After lengthy consideration, she is now off Gleevec for about 4 months.  Her blood counts have remained stable.    The patient's current medical problems were reviewed.    A living will is in the chart  The following health maintenance schedule was reviewed with the patient and provided in printed form in the after visit summary:   Health Maintenance   Topic Date Due     ZOSTER VACCINES (3 of 3) 01/07/2019     DXA SCAN  04/05/2019     FALL RISK ASSESSMENT  05/10/2020     MAMMOGRAM  04/09/2021     TD 18+ HE  10/24/2022     ADVANCE DIRECTIVES DISCUSSED WITH PATIENT  04/11/2023     COLONOSCOPY  03/31/2026     PNEUMOCOCCAL POLYSACCHARIDE VACCINE AGE 65  AND OVER  Completed     INFLUENZA VACCINE RULE BASED  Completed     PNEUMOCOCCAL CONJUGATE VACCINE FOR ADULTS (PCV13 OR PREVNAR)  Completed        Subjective:   Chief Complaint: Monika Meyer is an 73 y.o. female here for an Annual Wellness visit.   HPI: Monika is a delightful 73-year-old female who has had a complex medical history consistent with meningioma, breast cancer and more recently CML.  She had been on Gleevec for some time with good response.  She had recently decided with the help of the [facility] oncologist to go off treatment.  She has been monitored for the last 4 months and her blood counts have been stable.  She states she is feeling much better off the medication.  She has her energy back.  She is able to exercise.  She has no mucous membrane issues.    Health maintenance is reviewed and updated in the chart.    Laboratory studies reviewed.  Her meningioma MRI was last done on October 2018.  This will be done in 2 to 3 years.    Review of Systems:    Please see above.  The rest of the review of systems are negative for all systems.    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     Breast Cancer     Chronic Granulocytic Leukemia     Cerumen Impaction In The Left Ear     Mild atherosclerosis of carotid artery, bilateral     Osteopenia     Postmenopausal Atrophic Vaginitis     Past Medical History:   Diagnosis Date     Breast cancer (H)       Past Surgical History:   Procedure Laterality Date     BREAST LUMPECTOMY Left 1996     CRANIOTOMY  2004     MO EXCIS INFRATENT MENINGIOMA      Description: Craniotomy Suboccipital Excision Of Meningioma;   Recorded: 10/12/2010;     OK EXCISE BREAST CYST      Description: Breast Surgery Lumpectomy;  Recorded: 10/12/2010;     OK LIGATE FALLOPIAN TUBE      Description: Tubal Ligation;  Recorded: 10/12/2010;     TONSILLECTOMY        Family History   Problem Relation Age of Onset     Hypertension Mother       Social History     Socioeconomic History     Marital status:      Spouse name: Not on file     Number of children: Not on file     Years of education: Not on file     Highest education level: Not on file   Occupational History     Not on file   Social Needs     Financial resource strain: Not on file     Food insecurity:     Worry: Not on file     Inability: Not on file     Transportation needs:     Medical: Not on file     Non-medical: Not on file   Tobacco Use     Smoking status: Former Smoker     Last attempt to quit: 3/27/1987     Years since quittin.1     Smokeless tobacco: Never Used   Substance and Sexual Activity     Alcohol use: Yes     Drug use: No     Sexual activity: Not on file   Lifestyle     Physical activity:     Days per week: Not on file     Minutes per session: Not on file     Stress: Not on file   Relationships     Social connections:     Talks on phone: Not on file     Gets together: Not on file     Attends [employer] service: Not on file     Active member of club or organization: Not on file     Attends meetings of clubs or organizations: Not on file     Relationship status: Not on file     Intimate partner violence:     Fear of current or ex partner: Not on file     Emotionally abused: Not on file     Physically abused: Not on file     Forced sexual activity: Not on file   Other Topics Concern     Not on file   Social History Narrative     Not on file      Current Outpatient Medications   Medication Sig Dispense Refill     ascorbic acid (ASCORBIC ACID WITH SOHAN HIPS) 500 MG tablet Take 500 mg by mouth daily.       aspirin 81 MG EC tablet Take 81 mg by mouth daily.       CALCIUM  "CITRATE/VITAMIN D3 (CITRACAL + D ORAL) Take 2 capsules by mouth daily.        cholecalciferol, vitamin D3, 1,000 unit capsule Take 1,000 Units by mouth daily.       furosemide (LASIX) 20 MG tablet Take 20 mg by mouth daily.        magnesium 250 mg Tab Take 1 tablet by mouth daily.       multivit-min/iron/folic/pno417 (HAIR, SKIN AND NAILS ADVANCED ORAL) Take 5,000 mcg by mouth.       MULTIVITAMIN ORAL Take 1 capsule by mouth daily.        atorvastatin (LIPITOR) 10 MG tablet Take 1 tablet (10 mg total) by mouth daily. 90 tablet 3     No current facility-administered medications for this visit.       Objective:   Vital Signs:   Visit Vitals  /72 (Patient Site: Left Arm, Patient Position: Sitting, Cuff Size: Child)   Pulse 74   Ht 5' 2\" (1.575 m)   Wt 111 lb 2 oz (50.4 kg)   SpO2 98%   BMI 20.33 kg/m         VisionScreening:  No exam data present     PHYSICAL EXAM  EYES: Eyelids, conjunctiva, and sclera were normal. Pupils were normal. Cornea, iris, and lens were normal bilaterally.  HEAD, EARS, NOSE, MOUTH, AND THROAT: Head and face were normal. Hearing was normal to voice and the ears were normal to external exam. Nose appearance was normal and there was no discharge. Oropharynx was normal.  TMs were normal.  NECK: Neck appearance was normal. There were no neck masses and the thyroid was not enlarged.  RESPIRATORY: Breathing pattern was normal and the chest moved symmetrically.   Lung sounds were equal bilaterally.  CARDIOVASCULAR: Heart rate and rhythm were normal.  S1 and S2 were normal and there were no extra sounds or murmurs. Peripheral pulses in arms and legs were normal.  Jugular venous pressure was normal.  There was no peripheral edema.  GASTROINTESTINAL: The abdomen was normal in contour.  Bowel sounds were present.   Palpation detected no tenderness, mass, or enlarged organs.   MUSCULOSKELETAL: Skeletal configuration was normal and muscle mass was normal for age. Joint appearance was overall " normal.  LYMPHATIC: There were no enlarged nodes.  SKIN/HAIR/NAILS: Skin color was normal.  There were no abnormal skin lesions.  Hair and nails were normal.  NEUROLOGIC: The patient was alert and oriented to person, place, time, and circumstance. Speech was normal. Cranial nerves were normal. Motor strength was normal for age. The patient was normally coordinated.  PSYCHIATRIC:  Mood and affect were normal and the patient had normal recent and remote memory. The patient's judgment and insight were normal.          Assessment Results 5/10/2019   Activities of Daily Living No help needed   Instrumental Activities of Daily Living No help needed   Get Up and Go Score -   Mini Cog Total Score 5   Some recent data might be hidden     A Mini-Cog score of 0-2 suggests the possibility of dementia, score of 3-5 suggests no dementia    Identified Health Risks:     Patient's advanced directive was discussed and I am comfortable with the patient's wishes.

## 2021-05-29 ENCOUNTER — RECORDS - HEALTHEAST (OUTPATIENT)
Dept: ADMINISTRATIVE | Facility: CLINIC | Age: 76
End: 2021-05-29

## 2021-05-30 ENCOUNTER — RECORDS - HEALTHEAST (OUTPATIENT)
Dept: ADMINISTRATIVE | Facility: CLINIC | Age: 76
End: 2021-05-30

## 2021-05-30 VITALS — HEIGHT: 63 IN | WEIGHT: 113 LBS | BODY MASS INDEX: 20.02 KG/M2

## 2021-05-30 VITALS — WEIGHT: 109.5 LBS | BODY MASS INDEX: 19.71 KG/M2

## 2021-05-31 ENCOUNTER — RECORDS - HEALTHEAST (OUTPATIENT)
Dept: ADMINISTRATIVE | Facility: CLINIC | Age: 76
End: 2021-05-31

## 2021-06-01 VITALS — WEIGHT: 115.38 LBS | HEIGHT: 63 IN | BODY MASS INDEX: 20.45 KG/M2

## 2021-06-02 VITALS — WEIGHT: 113.4 LBS | BODY MASS INDEX: 20.41 KG/M2

## 2021-06-02 VITALS — BODY MASS INDEX: 20.46 KG/M2 | WEIGHT: 113.7 LBS

## 2021-06-03 VITALS — HEIGHT: 62 IN | WEIGHT: 111.13 LBS | BODY MASS INDEX: 20.45 KG/M2

## 2021-06-04 VITALS
WEIGHT: 106.75 LBS | SYSTOLIC BLOOD PRESSURE: 122 MMHG | OXYGEN SATURATION: 99 % | DIASTOLIC BLOOD PRESSURE: 70 MMHG | HEART RATE: 66 BPM | BODY MASS INDEX: 18.91 KG/M2 | HEIGHT: 63 IN

## 2021-06-09 NOTE — TELEPHONE ENCOUNTER
RN cannot approve Refill Request    RN can NOT refill this medication Protocol failed and NO refill given.       Tamanna Philip, [facility] Triage/Med Refill 7/20/2020    Requested Prescriptions   Pending Prescriptions Disp Refills     atorvastatin (LIPITOR) 10 MG tablet [Pharmacy Med Name: Atorvastatin Calcium Oral Tablet 10 MG] 90 tablet 3     Sig: TAKE ONE TABLET BY MOUTH ONE TIME DAILY       Statins Refill Protocol (Hmg CoA Reductase Inhibitors) Failed - 7/19/2020 12:41 PM        Failed - PCP or prescribing provider visit in past 12 months      Last office visit with prescriber/PCP: 8/22/2018 Lizabeth Turcios MD OR same dept: Visit date not found OR same specialty: 8/22/2018 Lizabeth Turcios MD  Last physical: 5/10/2019 Last MTM visit: Visit date not found   Next visit within 3 mo: Visit date not found  Next physical within 3 mo: Visit date not found  Prescriber OR PCP: Lizabeth Turcios MD  Last diagnosis associated with med order: 1. Preventative health care  - atorvastatin (LIPITOR) 10 MG tablet [Pharmacy Med Name: Atorvastatin Calcium Oral Tablet 10 MG]; TAKE ONE TABLET BY MOUTH ONE TIME DAILY   Dispense: 90 tablet; Refill: 0    If protocol passes may refill for 12 months if within 3 months of last provider visit (or a total of 15 months).

## 2021-06-09 NOTE — PROGRESS NOTES
Assessment and Plan:   Note all forms are reviewed with patient.  No problems identified    1. Preventative health care  She is up-to-date with regard to mammography, colon cancer screening, immunizations and routine labs.  Ophthalmologic, dental and her care are up-to-date.  She continues to keep physically active.  Her weight is stable    2. Hypercholesterolemia  We'll update labs  - Lipid [place] FASTING  - Comprehensive Metabolic Panel    3. Osteopenia  Bone density scheduled for April.  We'll monitor vitamin D levels.  - Vitamin D, Total (25-Hydroxy)    4. Chronic Granulocytic Leukemia  She receives quarterly hemograms.  Today, her hemogram one is completely within normal limits on her current regimen of Gleevec.  She sees her medical oncologist annually  - HM1(CBC and Differential)  - HM1 (CBC with Diff)    5.  History of meningioma  Due for MRI in February 2018.  She is followed by Dr. Whitmore.    6.  History of breast cancer  Mammograms are stable    The patient's current medical problems were reviewed.      The following health maintenance schedule was reviewed with the patient and provided in printed form in the after visit summary:   Health Maintenance   Topic Date Due     ADVANCE DIRECTIVES DISCUSSED WITH PATIENT  08/17/2017     DXA SCAN  04/02/2017     FALL RISK ASSESSMENT  05/11/2017     MAMMOGRAM  02/24/2019     TD 18+ HE  10/24/2022     COLONOSCOPY  03/31/2026     PNEUMOCOCCAL POLYSACCHARIDE VACCINE AGE 65 AND OVER  Completed     INFLUENZA VACCINE RULE BASED  Completed     PNEUMOCOCCAL CONJUGATE VACCINE FOR ADULTS (PCV13 OR PREVNAR)  Completed     ZOSTER VACCINE  Completed        Subjective:   Chief Complaint: Monika Meyer is an 71 y.o. female here for an Annual Wellness visit.   HPI:  Monika is delightful 71-year-old female who is here today for her annual wellness assessment as well as a general checkup.  She has absolutely no chief complaints today.  She does report that her stamina diminishes as the  "day progresses.  She states that she is very busy during the day.  She has a regular chores that she \"must get done\".  She is not one to let things go as her  occasionally recommends.    Other than the fatigue, she has no other symptoms.  Of note, she tends to keep active.  They enjoy cycling in the summertime.  They are busy with family members.    Health maintenance is reviewed and noted in the assessment    Review of Systems:  Other than fatigue,  Please see above.  The rest of the review of systems are negative for all systems.    Patient Care Team:  Lizabeth Turcios MD as PCP - General  Francisca Vázquez MD (Dermatology)  Levy Bledsoe MD as Physician (Gastroenterology)  Elva Whitmore MD as Physician (Neurosurgery)  Liv RAYMOND MD as Physician (Internal Medicine)  Tamanna Dent MD as Physician (Ophthalmology)     Patient Active Problem List   Diagnosis     Benign Tubular Adenoma Of The Large Intestine     Breast Cancer     Chronic Granulocytic Leukemia     Cerumen Impaction In The Left Ear     Hyperlipidemia     Osteopenia     Postmenopausal Atrophic Vaginitis     Past Medical History:   Diagnosis Date     Breast cancer       Past Surgical History:   Procedure Laterality Date     BREAST LUMPECTOMY Left 1996     CRANIOTOMY  2004     IA EXCIS INFRATENT MENINGIOMA      Description: Craniotomy Suboccipital Excision Of Meningioma;  Recorded: 10/12/2010;     IA EXCISE BREAST CYST      Description: Breast Surgery Lumpectomy;  Recorded: 10/12/2010;     IA LIGATE FALLOPIAN TUBE      Description: Tubal Ligation;  Recorded: 10/12/2010;     TONSILLECTOMY        Family History   Problem Relation Age of Onset     Hypertension Mother       Social History     Social History     Marital status:      Spouse name: N/A     Number of children: N/A     Years of education: N/A     Occupational History     Not on file.     Social History Main Topics     Smoking status: Former Smoker     Quit date: " "3/27/1987     Smokeless tobacco: Never Used     Alcohol use Yes     Drug use: No     Sexual activity: Not on file     Other Topics Concern     Not on file     Social History Narrative      Current Outpatient Prescriptions   Medication Sig Dispense Refill     ascorbic acid (ASCORBIC ACID WITH SOHAN HIPS) 500 MG tablet Take 500 mg by mouth daily.       aspirin 81 MG EC tablet Take 81 mg by mouth daily.       CALCIUM CITRATE/VITAMIN D3 (CITRACAL + D ORAL) Take 2 capsules by mouth daily.        cholecalciferol, vitamin D3, 1,000 unit capsule Take 1,000 Units by mouth daily.       furosemide (LASIX) 20 MG tablet Take 20 mg by mouth daily.        imatinib (GLEEVEC) 100 MG tablet Take 300 mg by mouth daily.       LACTOBACILLUS RHAMNOSUS GG (CULTURELLE ORAL) Take 1 capsule by mouth daily.       magnesium 250 mg Tab Take 1 tablet by mouth daily.       MULTIVITAMIN ORAL Take 1 capsule by mouth daily.        No current facility-administered medications for this visit.       Objective:   Vital Signs:   Visit Vitals     /64 (Patient Site: Left Arm, Patient Position: Sitting, Cuff Size: Adult Regular)     Pulse 66     Ht 5' 2.75\" (1.594 m)     Wt 113 lb (51.3 kg)     BMI 20.18 kg/m2        VisionScreening:  No exam data present     PHYSICAL EXAM  EYES: Eyelids, conjunctiva, and sclera were normal. Pupils were normal. Cornea, iris, and lens were normal bilaterally.  HEAD, EARS, NOSE, MOUTH, AND THROAT: Head and face were normal. Hearing was normal to voice and the ears were normal to external exam. Nose appearance was normal and there was no discharge. Oropharynx was normal.  TMs were normal.  NECK: Neck appearance was normal. There were no neck masses and the thyroid was not enlarged.  RESPIRATORY: Breathing pattern was normal and the chest moved symmetrically.   Lung sounds were equal bilaterally.  CARDIOVASCULAR: Heart rate and rhythm were normal.  S1 and S2 were normal and there were no extra sounds or murmurs. Peripheral " pulses in arms and legs were normal.  Jugular venous pressure was normal.  There was no peripheral edema.  GASTROINTESTINAL: The abdomen was normal in contour.  Bowel sounds were present.   Palpation detected no tenderness, mass, or enlarged organs.   MUSCULOSKELETAL: Skeletal configuration was normal and muscle mass was normal for age. Joint appearance was overall normal.  LYMPHATIC: There were no enlarged nodes.  SKIN/HAIR/NAILS: Skin color was normal.  There were no abnormal skin lesions.  Hair and nails were normal.  NEUROLOGIC: The patient was alert and oriented to person, place, time, and circumstance. Speech was normal. Cranial nerves were normal. Motor strength was normal for age. The patient was normally coordinated.  PSYCHIATRIC:  Mood and affect were normal and the patient had normal recent and remote memory. The patient's judgment and insight were normal.  BREASTS:  Examined bilaterally.  No masses, nipple discharge or axillary adenopathy        Assessment Results 3/31/2017   Activities of Daily Living No help needed   Instrumental Activities of Daily Living No help needed   Get Up and Go Score Less than 12 seconds   Mini Cog Total Score 5     A Mini-Cog score of 0-2 suggests the possibility of dementia, score of 3-5 suggests no dementia    Identified Health Risks:     The patient was counseled and encouraged to consider modifying their diet and eating habits. She was provided with information on recommended healthy diet options.  Patient's advanced directive was discussed and I am comfortable with the patient's wishes.

## 2021-06-09 NOTE — PROGRESS NOTES
[facility] Follow Up Note    Assessment/Plan:  1. Preventative health care  Orders placed for screening Mammel and bone densitometry.  She will schedule a comprehensive physical examination after April 2.  She will have both a bone density and exam done on the same day  - Mammo Screening Bilateral; Future  - DXA Bone Density Scan; Future    2. Rhino-sinusitis  Persistent-3-4 weeks in duration.  Purulent rhinorrhea.  No other major symptoms  Recommendation: Because of allergy to Solid and sensitive gastrointestinal tract, we'll proceed with a Z-Cayden, steaming, fluids.  She will contact me if not better        Lizabeth Turcios MD    Chief Complaint:  Chief Complaint   Patient presents with     Cough     Nasal Congestion       History of Present Illness:  Monika is a 71 y.o. female who is here today for evaluation of an upper respiratory tract illness that has been persistent.  She states her symptoms date back to early February.  She has developed garden Friday cold-like symptoms of runny nose, cough and nasal congestion.  Over time, the symptoms have not abated.  She has purulent rhinorrhea with postnasal drip is that results in an irritative cough.  Because of her underlying history of chronic leukemia, her  urges her to have an evaluation.  She denies any fever or chills.  She has no constitutional symptoms.  She denies teeth or jaw pain.  She does not have a nocturnal cough.  She has tried no palliative measures.  She is not short of breath.  Of note, with her treatment for CG L, she is tolerating the Gleevec and her gut is doing better now that she has introduced the probiotic.    Review of Systems:  A comprehensive review of systems was performed and was otherwise negative    PFSH:  Social History: She is  with adult children  History   Smoking Status     Former Smoker     Quit date: 3/27/1987   Smokeless Tobacco     Never Used       Past History: As noted in the snapshot  Current  Outpatient Prescriptions   Medication Sig Dispense Refill     ascorbic acid (ASCORBIC ACID WITH SOHAN HIPS) 500 MG tablet Take 500 mg by mouth daily.       aspirin 81 MG EC tablet Take 81 mg by mouth daily.       CALCIUM CITRATE/VITAMIN D3 (CITRACAL + D ORAL) Take 1 capsule by mouth daily.       cholecalciferol, vitamin D3, 1,000 unit capsule Take 1,000 Units by mouth daily.       furosemide (LASIX) 20 MG tablet Take 20 mg by mouth daily.        imatinib (GLEEVEC) 100 MG tablet Take 300 mg by mouth daily.       LACTOBACILLUS RHAMNOSUS GG (CULTURELLE ORAL) Take 1 capsule by mouth daily.       magnesium 250 mg Tab Take 1 tablet by mouth daily.       MULTIVITAMIN ORAL Take 1 capsule by mouth daily.        azithromycin (ZITHROMAX Z-RAÚL) 250 MG tablet Take 1 tablet (250 mg total) by mouth daily for 5 days. 2 tabs on day one then one tab daily 6 tablet 0     No current facility-administered medications for this visit.        Family History: Nothing new    Physical Exam:  General Appearance:   She appears at baseline but is speaking with the nasal and hoarse voice  Vitals:    02/22/17 0839   BP: 136/72   Patient Site: Left Arm   Patient Position: Sitting   Cuff Size: Adult Regular   Pulse: 73   Temp: 98  F (36.7  C)   TempSrc: Tympanic   SpO2: 98%   Weight: 109 lb 8 oz (49.7 kg)     Wt Readings from Last 3 Encounters:   02/22/17 109 lb 8 oz (49.7 kg)   05/11/16 107 lb (48.5 kg)   08/05/15 110 lb (49.9 kg)     Body mass index is 19.71 kg/(m^2).    Pupils are equal and reactive to light.  Extraocular movements are intact.  Minor sinus tenderness is noted.  Throat is clear and neck is supple  Lungs are clear to auscultation and percussion  Cardiac exam reveals regular rate and rhythm with no murmurs or gallops  Abdomen is soft and nontender  Skin is negative

## 2021-06-10 NOTE — PATIENT INSTRUCTIONS - HE
Advance Directive  Patient s advance directive was discussed and I am comfortable with the patient s wishes.  Patient Education   Personalized Prevention Plan  You are due for the preventive services outlined below.  Your care team is available to assist you in scheduling these services.  If you have already completed any of these items, please share that information with your care team to update in your medical record.  Health Maintenance   Topic Date Due     HEPATITIS C SCREENING  1945     INFLUENZA VACCINE RULE BASED (1) 08/01/2020     DXA SCAN  08/15/2021     MEDICARE ANNUAL WELLNESS VISIT  08/21/2021     FALL RISK ASSESSMENT  08/21/2021     TD 18+ HE  10/24/2022     ADVANCE CARE PLANNING  05/10/2024     LIPID  08/19/2025     COLORECTAL CANCER SCREENING  03/31/2026     PNEUMOCOCCAL IMMUNIZATION 65+ HIGH/HIGHEST RISK  Completed     ZOSTER VACCINES  Completed     HEPATITIS B VACCINES  Aged Out

## 2021-06-10 NOTE — PROGRESS NOTES
Assessment and Plan:   Annual wellness forms reviewed.  No new needs identified    1. Encounter for preventive care  She is up-to-date on mammography-noted in care everywhere.  Colonoscopy due in spring 2021.  Bone density due in 21 as well.  Ophthalmologic, dental and Derm care are all up-to-date  She and her  are exercising regularly and maintaining a healthy weight.    2. Chronic Granulocytic Leukemia  Hemogram 1 done every 2 months by hematology.  Stable.  On no medications    3. Malignant neoplasm of female breast, unspecified estrogen receptor status, unspecified laterality, unspecified site of breast (H)  Left breast-mammogram unremarkable.  Breast exam negative    4. Mild atherosclerosis of carotid artery, bilateral  Due for carotid ultrasound.  No bruits on examination.  LDL cholesterol at 78-on Lipitor and baby aspirin  - US Carotid Bilateral; Future     The patient's current medical problems were reviewed.      The following health maintenance schedule was reviewed with the patient and provided in printed form in the after visit summary:   Health Maintenance   Topic Date Due     HEPATITIS C SCREENING  1945     INFLUENZA VACCINE RULE BASED (1) 08/01/2020     DXA SCAN  08/15/2021     MEDICARE ANNUAL WELLNESS VISIT  08/21/2021     FALL RISK ASSESSMENT  08/21/2021     TD 18+ HE  10/24/2022     ADVANCE CARE PLANNING  05/10/2024     LIPID  08/19/2025     COLORECTAL CANCER SCREENING  03/31/2026     PNEUMOCOCCAL IMMUNIZATION 65+ HIGH/HIGHEST RISK  Completed     ZOSTER VACCINES  Completed     HEPATITIS B VACCINES  Aged Out        Subjective:   Chief Complaint: Monika Meyer is an 75 y.o. female here for an Annual Wellness visit.   HPI: Monika is a delightful 75-year-old female who is here today for a preventative health examination.  Of note, she reports no complaints.  She is doing well with regard to the COVID quarantine.  She has no specific major medical issues.  Her history includes C GL, remote  history of breast cancer, history of meningioma.  She is up-to-date on all of her surveillance.  Health maintenance is up-to-date.    Lifestyle is reviewed.  She and her  eat a very healthy diet.  She maintains a healthy weight.  They walk for 1 hour every day.  She has no chest pain, shortness of breath, new bowel or bladder symptoms.    She is due for an MRI of the brain to follow her meningioma in 2021/colonoscopy-2021/bone density-2021/annual mammograms.    Review of Systems:    Please see above.  The rest of the review of systems are negative for all systems.    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     Breast Cancer     Chronic Granulocytic Leukemia     Cerumen Impaction In The Left Ear     Mild atherosclerosis of carotid artery, bilateral     Osteopenia     Postmenopausal Atrophic Vaginitis     Past Medical History:   Diagnosis Date     Breast cancer (H)       Past Surgical History:   Procedure Laterality Date     BREAST LUMPECTOMY Left 1996     CRANIOTOMY  2004     [place] EXCIS INFRATENT MENINGIOMA      Description: Craniotomy Suboccipital Excision Of Meningioma;  Recorded: 10/12/2010;     [place] EXCISE BREAST CYST      Description: Breast Surgery Lumpectomy;  Recorded: 10/12/2010;     [place] LIGATE FALLOPIAN TUBE      Description: Tubal Ligation;  Recorded: 10/12/2010;     TONSILLECTOMY        Family History   Problem Relation Age of Onset     Hypertension Mother       Social History     Socioeconomic History     Marital status:      Spouse name: Not on file     Number of children: Not on file     Years of  education: Not on file     Highest education level: Not on file   Occupational History     Not on file   Social Needs     Financial resource strain: Not on file     Food insecurity     Worry: Not on file     Inability: Not on file     Transportation needs     Medical: Not on file     Non-medical: Not on file   Tobacco Use     Smoking status: Former Smoker     Last attempt to quit: 3/27/1987     Years since quittin.4     Smokeless tobacco: Never Used   Substance and Sexual Activity     Alcohol use: Yes     Drug use: No     Sexual activity: Not on file   Lifestyle     Physical activity     Days per week: Not on file     Minutes per session: Not on file     Stress: Not on file   Relationships     Social connections     Talks on phone: Not on file     Gets together: Not on file     Attends Druze service: Not on file     Active member of club or organization: Not on file     Attends meetings of clubs or organizations: Not on file     Relationship status: Not on file     Intimate partner violence     Fear of current or ex partner: Not on file     Emotionally abused: Not on file     Physically abused: Not on file     Forced sexual activity: Not on file   Other Topics Concern     Not on file   Social History Narrative     Not on file      Current Outpatient Medications   Medication Sig Dispense Refill     ascorbic acid (ASCORBIC ACID WITH SOHAN HIPS) 500 MG tablet Take 500 mg by mouth daily.       aspirin 81 MG EC tablet Take 81 mg by mouth daily.       atorvastatin (LIPITOR) 10 MG tablet TAKE ONE TABLET BY MOUTH ONE TIME DAILY  90 tablet 3     CALCIUM CITRATE/VITAMIN D3 (CITRACAL + D ORAL) Take 2 capsules by mouth daily.        cholecalciferol, vitamin D3, 1,000 unit capsule Take 1,000 Units by mouth daily.       magnesium 250 mg Tab Take 1 tablet by mouth daily.       multivit-min/iron/folic/vii951 (HAIR, SKIN AND NAILS ADVANCED ORAL) Take 5,000 mcg by mouth.       No current facility-administered medications for  "this visit.       Objective:   Vital Signs:   Visit Vitals  /70 (Patient Site: Left Arm, Patient Position: Sitting, Cuff Size: Adult Regular)   Pulse 66   Ht 5' 2.5\" (1.588 m)   Wt 106 lb 12 oz (48.4 kg)   SpO2 99%   BMI 19.21 kg/m           VisionScreening:  No exam data present     PHYSICAL EXAM  EYES: Eyelids, conjunctiva, and sclera were normal. Pupils were normal. Cornea, iris, and lens were normal bilaterally.  HEAD, EARS, NOSE, MOUTH, AND THROAT: Head and face were normal. Hearing was normal to voice and the ears were normal to external exam. Nose appearance was normal and there was no discharge. Oropharynx was normal.  TMs were normal.  NECK: Neck appearance was normal. There were no neck masses and the thyroid was not enlarged.  RESPIRATORY: Breathing pattern was normal and the chest moved symmetrically.   Lung sounds were equal bilaterally.  CARDIOVASCULAR: Heart rate and rhythm were normal.  S1 and S2 were normal and there were no extra sounds or murmurs. Peripheral pulses in arms and legs were normal.  Jugular venous pressure was normal.  There was no peripheral edema.  GASTROINTESTINAL: The abdomen was normal in contour.  Bowel sounds were present.   Palpation detected no tenderness, mass, or enlarged organs.   MUSCULOSKELETAL: Skeletal configuration was normal and muscle mass was normal for age. Joint appearance was overall normal.  LYMPHATIC: There were no enlarged nodes.  SKIN/HAIR/NAILS: Skin color was normal.  There were no abnormal skin lesions.  Hair and nails were normal.  NEUROLOGIC: The patient was alert and oriented to person, place, time, and circumstance. Speech was normal. Cranial nerves were normal. Motor strength was normal for age. The patient was normally coordinated.  PSYCHIATRIC:  Mood and affect were normal and the patient had normal recent and remote memory. The patient's judgment and insight were normal.          Assessment Results 8/21/2020   Activities of Daily Living No " help needed   Instrumental Activities of Daily Living No help needed   Get Up and Go Score Less than 12 seconds   Mini Cog Total Score 5   Some recent data might be hidden     A Mini-Cog score of 0-2 suggests the possibility of dementia, score of 3-5 suggests no dementia        Identified Health Risks:     Patient's advanced directive was discussed and I am comfortable with the patient's wishes.

## 2021-06-10 NOTE — TELEPHONE ENCOUNTER
Betsy has an appointment for fasting labs 8/19 prior to scheduled physical 8/21.  Please place orders if appropriate.  If not appropriate, please advise patient.    Thanks,    Lab

## 2021-06-14 ENCOUNTER — RECORDS - HEALTHEAST (OUTPATIENT)
Dept: ADMINISTRATIVE | Facility: OTHER | Age: 76
End: 2021-06-14

## 2021-06-14 ENCOUNTER — RECORDS - HEALTHEAST (OUTPATIENT)
Dept: RADIOLOGY | Facility: CLINIC | Age: 76
End: 2021-06-14

## 2021-06-15 NOTE — TELEPHONE ENCOUNTER
"Reason for Call:  Other call back      Detailed comments: PT CALLING REGARDING A NEW WEBSITE CALLED \"CARE LOOPS\" - ANY REASON NOT TO OPT OUT?    CORRECTION:  \"GET WELL LOOP\" NOT CARE LOOPS  Phone Number Patient can be reached at: Home number on file [phone] (home)    Best Time: ANYTIME    Can we leave a detailed message on this number?: Yes    Call taken on 2/22/2021 at 2:38 PM by Veronica Gaspar    "

## 2021-06-15 NOTE — TELEPHONE ENCOUNTER
Spoke with pt and explained that she didn't need to opt in to the Get well loop if she didn't want to, pt could continue to communicate with pcp through mychart.  Pt understanding.

## 2021-06-17 ENCOUNTER — COMMUNICATION - HEALTHEAST (OUTPATIENT)
Dept: NEUROSURGERY | Facility: CLINIC | Age: 76
End: 2021-06-17

## 2021-06-17 NOTE — TELEPHONE ENCOUNTER
Monika Meyer is status post brain meningioma removal in 2004 by Dr. Whitmore.  She has been getting surveillance MRI every 2 years. She is requesting another scan.   MRI was ordered with [facility], recommendations will follow.  Myranda Pacheco RN, CNRN

## 2021-06-17 NOTE — PROGRESS NOTES
Assessment and Plan:   Annual wellness forms reviewed.  No needs identified    1. Preventative health care  She is up-to-date with regard to colon cancer screening, mammography, immunizations, labs are reviewed with her today.  Lifestyle is reviewed.  She is exercising regularly at her healthcare system.  Weight is stable and diet is clean    2. Chronic Granulocytic Leukemia  Stable.  She is followed by [facility].  Complete blood count is within normal limits.    3. Breast Cancer  Mammogram stable    4. Osteopenia  Have encouraged dietary calcium, weight bearing and balance exercises.  Bone density is up-to-date    5. 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 Multiple thyroid nodules  Thyroid biopsy last are completely negative.  Will recheck ultrasound for nodular thyroid  - US Thyroid; Future     The patient's current medical problems were reviewed.      The following health maintenance schedule was reviewed with the patient and provided in printed form in the after visit summary:   Health Maintenance   Topic Date Due     DXA SCAN  04/05/2019     FALL RISK ASSESSMENT  04/11/2019     MAMMOGRAM  03/22/2020     ADVANCE DIRECTIVES DISCUSSED WITH PATIENT  03/31/2022     TD 18+ HE  10/24/2022     COLONOSCOPY  03/31/2026     PNEUMOCOCCAL POLYSACCHARIDE VACCINE AGE 65 AND OVER  Completed     INFLUENZA VACCINE RULE BASED  Completed     PNEUMOCOCCAL CONJUGATE VACCINE FOR ADULTS (PCV13 OR PREVNAR)  Completed     ZOSTER VACCINE  Completed        Subjective:   Chief Complaint: Monika Meyer is an 72 y.o. female here for an Annual Wellness visit.   HPI: Monika is a delightful 72-year-old female who is here today for an annual wellness examination.  Of note, she has absolutely no chief complaints.  Things are going very well.  From a physical point of view, she is stable.  Because her chronic granulocytic leukemia is stable, she is not having any untoward side effects with  medications, increased infections etc.    Health maintenance is reviewed and updated in the electronic health record.    She has some questions regarding thyroid follow-up.  She had a thyroid biopsy 1 year ago that was negative.    Meningioma follow-up was done earlier this year as well.  MRI is stable.  This was reviewed with Monika.    Review of Systems:    Please see above.  The rest of the review of systems are negative for all systems.    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     Breast Cancer     Chronic Granulocytic Leukemia     Cerumen Impaction In The Left Ear     Hyperlipidemia     Osteopenia     Postmenopausal Atrophic Vaginitis     Past Medical History:   Diagnosis Date     Breast cancer       Past Surgical History:   Procedure Laterality Date     BREAST LUMPECTOMY Left 1996     CRANIOTOMY  2004     [facility] EXCIS INFRATENT MENINGIOMA      Description: Craniotomy Suboccipital Excision Of Meningioma;  Recorded: 10/12/2010;     [facility] EXCISE BREAST CYST      Description: Breast Surgery Lumpectomy;  Recorded: 10/12/2010;     [facility] LIGATE FALLOPIAN TUBE      Description: Tubal Ligation;  Recorded: 10/12/2010;     TONSILLECTOMY        Family History   Problem Relation Age of Onset     Hypertension Mother       Social History     Social History     Marital status:      Spouse name: N/A     Number of children: N/A     Years of education: N/A     Occupational History     Not on file.     Social History Main Topics     Smoking status: Former Smoker     Quit date: 3/27/1987     Smokeless tobacco: Never Used     Alcohol use Yes     Drug use: No     Sexual activity: Not  "on file     Other Topics Concern     Not on file     Social History Narrative      Current Outpatient Prescriptions   Medication Sig Dispense Refill     ascorbic acid (ASCORBIC ACID WITH SOHAN HIPS) 500 MG tablet Take 500 mg by mouth daily.       aspirin 81 MG EC tablet Take 81 mg by mouth daily.       CALCIUM CITRATE/VITAMIN D3 (CITRACAL + D ORAL) Take 2 capsules by mouth daily.        cholecalciferol, vitamin D3, 1,000 unit capsule Take 1,000 Units by mouth daily.       furosemide (LASIX) 20 MG tablet Take 20 mg by mouth daily.        imatinib (GLEEVEC) 100 MG tablet Take 300 mg by mouth daily.       LACTOBACILLUS RHAMNOSUS GG (CULTURELLE ORAL) Take 1 capsule by mouth daily.       magnesium 250 mg Tab Take 1 tablet by mouth daily.       MULTIVITAMIN ORAL Take 1 capsule by mouth daily.        No current facility-administered medications for this visit.       Objective:   Vital Signs:   Visit Vitals     /70 (Patient Site: Left Arm, Patient Position: Sitting, Cuff Size: Adult Regular)     Pulse 66     Ht 5' 2.5\" (1.588 m)     Wt 115 lb 6 oz (52.3 kg)     SpO2 99%     BMI 20.77 kg/m2        VisionScreening:  No exam data present     PHYSICAL EXAM  EYES: Eyelids, conjunctiva, and sclera were normal. Pupils were normal. Cornea, iris, and lens were normal bilaterally.  HEAD, EARS, NOSE, MOUTH, AND THROAT: Head and face were normal. Hearing was normal to voice and the ears were normal to external exam. Nose appearance was normal and there was no discharge. Oropharynx was normal.  TMs were normal.  NECK: Neck appearance was normal. There were no neck masses and the thyroid was not enlarged.  RESPIRATORY: Breathing pattern was normal and the chest moved symmetrically.   Lung sounds were equal bilaterally.  CARDIOVASCULAR: Heart rate and rhythm were normal.  S1 and S2 were normal and there were no extra sounds or murmurs. Peripheral pulses in arms and legs were normal.  Jugular venous pressure was normal.  There was no " peripheral edema.  GASTROINTESTINAL: The abdomen was normal in contour.  Bowel sounds were present.   Palpation detected no tenderness, mass, or enlarged organs.   MUSCULOSKELETAL: Skeletal configuration was normal and muscle mass was normal for age. Joint appearance was overall normal.  LYMPHATIC: There were no enlarged nodes.  SKIN/HAIR/NAILS: Skin color was normal.  There were no abnormal skin lesions.  Hair and nails were normal.  NEUROLOGIC: The patient was alert and oriented to person, place, time, and circumstance. Speech was normal. Cranial nerves were normal. Motor strength was normal for age. The patient was normally coordinated.  PSYCHIATRIC:  Mood and affect were normal and the patient had normal recent and remote memory. The patient's judgment and insight were normal.  BREASTS: Examined bilaterally.  They are within normal limits.  Breast tissue is dense but no discrete masses are noted        Assessment Results 4/11/2018   Activities of Daily Living No help needed   Instrumental Activities of Daily Living No help needed   Get Up and Go Score -   Mini Cog Total Score 5   Some recent data might be hidden     A Mini-Cog score of 0-2 suggests the possibility of dementia, score of 3-5 suggests no dementia    Identified Health Risks:     Patient's advanced directive was discussed and I am comfortable with the patient's wishes.

## 2021-06-20 NOTE — PROGRESS NOTES
[facility]    Assessment/Plan:  1. Vaginal irritation in the setting of postmenopausal atrophic vaginitis  Wet prep negative.  Exam relatively unremarkable with exception of atrophic vaginitis.  No rashes noted.  Recommendations: Would treat empirically for possibility of yeast infection given symptoms of pruritus.  Recommend clotrimazole cream or miconazole cream to be applied intravaginally and to surrounding tissues nightly for the next 7 days.  She will contact me with an update on progress.  She is also using Vagisil as needed and vaginal lubricants for intercourse.  Of note, there is a personal history of breast cancer.  Therefore, estrogens are not considered today.    - Urinalysis-UC if Indicated; Future  - Urinalysis-UC if Indicated  - Wet Prep, Vaginal  Lizabeth Lu Turcios MD    Chief Complaint:  Chief Complaint   Patient presents with     Vaginal Itching       History of Present Illness:  Monika is a 73 y.o. female who is here today for evaluation of vaginal and vulvar itching.  Of note, her history is significant for atrophic vaginitis.  She denies any issues with discharge but states that there is itching which is particularly irritating at night.  She states that she occasionally uses a light pad but not often.  She is tried Replens vaginal moisturizer without difficulty.  She denies any discharge, recent antibiotic use or pain with intercourse.  In fact, she states that with Astra glide, intercourse is more comfortable.    Of note, her history is significant for CML.  She is on Gleevec chronically    Review of Systems:  A comprehensive review of systems was performed and was otherwise negative    PFSH:  Social History: She is  with adult children  History   Smoking Status     Former Smoker     Quit date: 3/27/1987   Smokeless Tobacco     Never Used       Past History:   Past Medical History:   Diagnosis Date     Breast cancer (H)        Current Outpatient Prescriptions    Medication Sig Dispense Refill     ascorbic acid (ASCORBIC ACID WITH SOHAN HIPS) 500 MG tablet Take 500 mg by mouth daily.       aspirin 81 MG EC tablet Take 81 mg by mouth daily.       CALCIUM CITRATE/VITAMIN D3 (CITRACAL + D ORAL) Take 2 capsules by mouth daily.        cholecalciferol, vitamin D3, 1,000 unit capsule Take 1,000 Units by mouth daily.       furosemide (LASIX) 20 MG tablet Take 20 mg by mouth daily.        imatinib (GLEEVEC) 100 MG tablet Take 300 mg by mouth daily.       LACTOBACILLUS RHAMNOSUS GG (CULTURELLE ORAL) Take 1 capsule by mouth daily.       magnesium 250 mg Tab Take 1 tablet by mouth daily.       MULTIVITAMIN ORAL Take 1 capsule by mouth daily.        No current facility-administered medications for this visit.        Family History: Noncontributory    Physical Exam:  General Appearance:   She is pleasant and well-appearing and in no acute distress  Vitals:    08/22/18 1319   BP: 134/62   Patient Site: Left Arm   Patient Position: Sitting   Cuff Size: Adult Regular   Pulse: 72   Weight: 113 lb 11.2 oz (51.6 kg)     Wt Readings from Last 3 Encounters:   08/22/18 113 lb 11.2 oz (51.6 kg)   04/11/18 115 lb 6 oz (52.3 kg)   03/31/17 113 lb (51.3 kg)     Body mass index is 20.46 kg/(m^2).    External genitalia within normal limits  Speculum inserted.  Vaginal mucosa is atrophic.  There is a very thin white discharge noted and a wet prep is taken.  No external lesions noted.

## 2021-06-20 NOTE — PROGRESS NOTES
"ASSESSMENT:   1. Dizziness  HM1(CBC and Differential)    [facility]AT CHEM 7 ([facility] ONLY)    Orthostatic blood pressure    HM1 (CBC with Diff)       PLAN:  73-year-old female with a past medical history significant for CML, meningioma presents for evaluation of 1 week of intermittent \"dizzy spells\".  Patient has difficulty qualifying these dizzy spells but notes it feels most like she is off balance for short time, specifically when she bends over.  It does solve when she stands back up.  No chest pain, shortness of breath, palpitations to suggest a cardiac etiology.  Nonfocal neuro exam today.  No ill symptoms, congestion, runny nose, ear pain to suggest an ENT etiology for her dizziness.  Labs are obtained, no significant anemia or electrolyte derangement is noted.  1 week of symptoms and a nonfocal neuro exam in clinic today, I do not feel advanced imaging of her head is emergently indicated today.  She does have an appointment in 2 days with a partner of her primary care provider.  She does also state that she intends to contact her neurologist tomorrow morning and discuss the symptoms with her as well.  We did discuss that should this get worse, she have any other concerns she should present to the emergency department immediately.    I discussed red flag symptoms, return precautions to clinic/ER and follow up care with patient/parent.  Expected clinical course, symptomatic cares advised. Questions answered. Patient/parent amenable with plan.    Patient Instructions:  Patient Instructions   All of your lab tests today are fine.  I am not sure why this is happening, however I do think you need to follow up with your PCP this week.  We will help you schedule this.  If you have any worsening, develop chest pain, shortness of breath or any other concerns, please go right to the ER.      SUBJECTIVE:   Monika Meyer is a 73 y.o. female who presents today with \"dizzy spells\" for about one week.  This is occurring 2-3 " times a day, only occurs with bending over.  Resolves immediately when she stands up.  Denies sinus congestion, ear pain.  No chest pain, palpitations, shortness of breath.  Unsure if she becomes nauseated with these symptoms.  History of a meningioma, most recent MRI in Feb was normal.  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  No recent change in medications.  Eating and drinking normally.  No recent illness.  No fevers.  No dysuria, hematuria, increased frequency of urination.  No nausea, vomiting, diarrhea, abdominal pain, back pain, unusual bruising/bleeding. No weakness.  No difficulty with speech, word finding, confusion. No issues with gait.      ROS:  Comprehensive 12 pt ROS completed, positives noted in HPI, otherwise negative.      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     Breast Cancer     Chronic Granulocytic Leukemia     Cerumen Impaction In The Left Ear     Hyperlipidemia     Osteopenia     Postmenopausal Atrophic Vaginitis       Surgical History:  Past Surgical History:   Procedure Laterality Date     BREAST LUMPECTOMY Left 1996     CRANIOTOMY  2004     HI EXCIS INFRATENT MENINGIOMA      Description: Craniotomy Suboccipital Excision Of Meningioma;  Recorded: 10/12/2010;     HI EXCISE BREAST CYST      Description: Breast Surgery Lumpectomy;  Recorded: 10/12/2010;     HI LIGATE FALLOPIAN TUBE      Description: Tubal Ligation;  Recorded: 10/12/2010;     TONSILLECTOMY             Family History:  Family History   Problem Relation Age of Onset     Hypertension Mother        Reviewed; Non-contributory    History   Smoking Status     Former Smoker     Quit date: 3/27/1987   Smokeless Tobacco     Never Used     Current Medications:  Current Outpatient Prescriptions on File Prior to Visit   Medication Sig Dispense Refill     ascorbic acid (ASCORBIC ACID WITH SOHAN HIPS) 500 MG tablet Take 500 mg by mouth daily.       aspirin  81 MG EC tablet Take 81 mg by mouth daily.       CALCIUM CITRATE/VITAMIN D3 (CITRACAL + D ORAL) Take 2 capsules by mouth daily.        cholecalciferol, vitamin D3, 1,000 unit capsule Take 1,000 Units by mouth daily.       furosemide (LASIX) 20 MG tablet Take 20 mg by mouth daily.        imatinib (GLEEVEC) 100 MG tablet Take 300 mg by mouth daily.       LACTOBACILLUS RHAMNOSUS GG (CULTURELLE ORAL) Take 1 capsule by mouth daily.       magnesium 250 mg Tab Take 1 tablet by mouth daily.       MULTIVITAMIN ORAL Take 1 capsule by mouth daily.        No current facility-administered medications on file prior to visit.        Allergies:   Allergies   Allergen Reactions     Fluconazole      Cefazolin Rash       OBJECTIVE:   Vitals:    10/01/18 1526 10/01/18 1555 10/01/18 1557   BP: 120/82 178/85 (!) 186/84   Patient Site:  Right Arm Right Arm   Patient Position:  Lying Standing   Cuff Size:  Adult Regular Adult Regular   Pulse: 65 60 65   Resp: 16     Temp: 97  F (36.1  C)     TempSrc: Oral     SpO2: 98%     Weight: 113 lb 6.4 oz (51.4 kg)       Physical exam reveals a pleasant 73 y.o. female.   General Appearance:  Alert, cooperative, no distress, appears stated age. Afebrile.  Integument: Warm, dry, no rashes or lesions.  HEENT:  Head: Atraumatic, normocephalic. No cranial bone tenderness. Face nontraumatic.  Eyes: Conjunctiva clear, Lids normal. PERRL.   Nose: nares patent. No erythema of nasal mucosa. No rhinorrhea.  Neck: Supple. No Cspine tenderness.  Respiratory: No distress. Lungs clear to ausculation bilaterally. No crackles, wheezes, rhonchi or stridor.  Cardiovascular:: Regular rate, regular rhythm. No murmurs, rubs, clicks or gallops. No obvious chest wall deformities. Peripheral pulses 2+ bilaterally. No peripheral edema.  GI: Soft, nontender, normal bowel sounds. No masses, organomegaly, rigidity, or guarding.  Musculoskeletal: No swelling or erythema of extremities. Moves all extremities equally. Back: no  Tspine or Lspine tenderness.   Neurologic: Alert & oriented to person, place and time. Normal tone. PERRL. Normal speech, no dysarthria.  CN II-XII grossly intact. Motor: RUE/LUE  strength 5/5 bilaterally, elbow flexion/extension 5/5 bilaterally, shoulder elevation 5/5 bilaterally. RLE/LLE knee flexion/extension 5/5 bilaterally, ankle plantar/dorsiflexion 5/5 bilaterally. No pronator drift. Sensory: intact distally  Gait: Normal, no assistive devices. Assistance of one. Psychomotor slowing (-). Abnormal Movements (-).Rapid alternating Movements intact. Finger nose finger intact. Heel to shin normal bilaterally.  Psych: Normal mood and affect.    RADIOLOGY    none  LABORATORY STUDIES    Recent Results (from the past 24 hour(s))   [facility]AT CHEM 7 ([facility] ONLY)   Result Value Ref Range    Sodium 140 136 - 145 mmol/L    Potassium 4.6 3.5 - 5.5 mmol/L    CO2 27 22 - 31 mmol/L    Chloride 103 98 - 107 mmol/L    Anion Gap, Calculation 10 5 - 18 mmol/L    Glucose 103 70 - 125 mg/dL    BUN 13 8 - 28 mg/dL    Creatinine 1.00 0.70 - 1.30 mg/dL   HM1 (CBC with Diff)   Result Value Ref Range    WBC 6.4 4.0 - 11.0 thou/uL    RBC 3.74 (L) 3.80 - 5.40 mill/uL    Hemoglobin 11.8 (L) 12.0 - 16.0 g/dL    Hematocrit 35.7 35.0 - 47.0 %    MCV 95 80 - 100 fL    MCH 31.5 27.0 - 34.0 pg    MCHC 33.0 32.0 - 36.0 g/dL    RDW 12.4 11.0 - 14.5 %    Platelets 258 140 - 440 thou/uL    MPV 7.6 7.0 - 10.0 fL    Neutrophils % 60 50 - 70 %    Lymphocytes % 29 20 - 40 %    Monocytes % 8 2 - 10 %    Eosinophils % 4 0 - 6 %    Basophils % 1 0 - 2 %    Neutrophils Absolute 3.8 2.0 - 7.7 thou/uL    Lymphocytes Absolute 1.8 0.8 - 4.4 thou/uL    Monocytes Absolute 0.5 0.0 - 0.9 thou/uL    Eosinophils Absolute 0.3 0.0 - 0.4 thou/uL    Basophils Absolute 0.0 0.0 - 0.2 thou/uL           Adia Baldwin, CNP

## 2021-06-26 ENCOUNTER — HEALTH MAINTENANCE LETTER (OUTPATIENT)
Age: 76
End: 2021-06-26

## 2021-06-26 NOTE — TELEPHONE ENCOUNTER
Called Monika with results of her stable MRI scan. Repeat MRI in 2 years.  Myranda Pacheco RN, CNRN

## 2021-06-26 NOTE — TELEPHONE ENCOUNTER
Patient is calling to get the results of her latest brain MRI.    Best number to reach her: [phone]

## 2021-06-28 ENCOUNTER — COMMUNICATION - HEALTHEAST (OUTPATIENT)
Dept: INTERNAL MEDICINE | Facility: CLINIC | Age: 76
End: 2021-06-28

## 2021-06-28 DIAGNOSIS — M94.9 DISORDER OF BONE AND CARTILAGE: ICD-10-CM

## 2021-06-28 DIAGNOSIS — I65.23 MILD ATHEROSCLEROSIS OF CAROTID ARTERY, BILATERAL: ICD-10-CM

## 2021-06-28 DIAGNOSIS — E83.50 UNSPECIFIED DISORDER OF CALCIUM METABOLISM: ICD-10-CM

## 2021-06-28 DIAGNOSIS — Z13.21 ENCOUNTER FOR VITAMIN DEFICIENCY SCREENING: ICD-10-CM

## 2021-06-28 DIAGNOSIS — Z13.220 SCREENING FOR HYPERLIPIDEMIA: ICD-10-CM

## 2021-06-28 DIAGNOSIS — M89.9 DISORDER OF BONE AND CARTILAGE: ICD-10-CM

## 2021-06-30 ENCOUNTER — COMMUNICATION - HEALTHEAST (OUTPATIENT)
Dept: INTERNAL MEDICINE | Facility: CLINIC | Age: 76
End: 2021-06-30

## 2021-06-30 DIAGNOSIS — Z00.00 PREVENTATIVE HEALTH CARE: ICD-10-CM

## 2021-06-30 RX ORDER — ATORVASTATIN CALCIUM 10 MG/1
TABLET, FILM COATED ORAL
Qty: 90 TABLET | Refills: 0 | Status: SHIPPED | OUTPATIENT
Start: 2021-06-30 | End: 2022-01-21

## 2021-07-03 NOTE — ADDENDUM NOTE
Addendum Note by Farnaz Turcios MD at 8/31/2020  2:29 PM     Author: Farnaz Turcios MD Service: -- Author Type: Physician    Filed: 8/31/2020  2:29 PM Encounter Date: 8/26/2020 Status: Signed    : Farnaz Turcios MD (Physician)    Addended by: FARNAZ TURCIOS on: 8/31/2020 02:29 PM        Modules accepted: Orders

## 2021-07-04 NOTE — TELEPHONE ENCOUNTER
Telephone Encounter by Ed Reynolds RN at 6/30/2021  7:25 AM     Author: Ed Reynolds RN Service: -- Author Type: Registered Nurse    Filed: 6/30/2021  7:26 AM Encounter Date: 6/30/2021 Status: Signed    : Ed Reynolds, RN (Registered Nurse)       RN cannot approve Refill Request    RN can NOT refill this medication   Patient request early refill. Medication last filled 7/20/20 for qty 90 refill 3. Provider to advise on request. . Last office visit: 8/22/2018 Lizabeth Turcios MD Last Physical: 8/21/2020 Last MTM visit: Visit date not found Last visit same specialty: 8/22/2018 Lizabeth Turcios MD.  Next visit within 3 mo: Visit date not found  Next physical within 3 mo: Visit date not found      Dayron Hunt Connection Triage/Med Refill 6/30/2021    Requested Prescriptions   Pending Prescriptions Disp Refills   ? atorvastatin (LIPITOR) 10 MG tablet [Pharmacy Med Name: Atorvastatin Calcium Oral Tablet 10 MG] 90 tablet 0     Sig: TAKE ONE TABLET BY MOUTH ONE TIME DAILY       Statins Refill Protocol (Hmg CoA Reductase Inhibitors) Passed - 6/30/2021  2:02 AM        Passed - PCP or prescribing provider visit in past 12 months      Last office visit with prescriber/PCP: 8/22/2018 Lizabeth Turcios MD OR same dept: Visit date not found OR same specialty: 8/22/2018 Lizabeth Turcios MD  Last physical: 8/21/2020 Last MTM visit: Visit date not found   Next visit within 3 mo: Visit date not found  Next physical within 3 mo: Visit date not found  Prescriber OR PCP: Lizabeth Turcios MD  Last diagnosis associated with med order: 1. Preventative health care  - atorvastatin (LIPITOR) 10 MG tablet [Pharmacy Med Name: Atorvastatin Calcium Oral Tablet 10 MG]; TAKE ONE TABLET BY MOUTH ONE TIME DAILY   Dispense: 90 tablet; Refill: 0    If protocol passes may refill for 12 months if within 3 months of last provider visit (or a total of 15 months).

## 2021-07-07 NOTE — TELEPHONE ENCOUNTER
Orders being requested: [facility]  Reason service is needed/diagnosis:AWV is scheduled 8/25 wanting to do orders in July  When are orders needed by: by 7/12/2021  Where to send Orders: in her chart  Okay to leave detailed message?  Yes, when the orders have been placed.

## 2021-07-12 ENCOUNTER — LAB (OUTPATIENT)
Dept: LAB | Facility: CLINIC | Age: 76
End: 2021-07-12
Payer: COMMERCIAL

## 2021-07-12 DIAGNOSIS — I65.23 MILD ATHEROSCLEROSIS OF CAROTID ARTERY, BILATERAL: ICD-10-CM

## 2021-07-12 DIAGNOSIS — M89.9 DISORDER OF BONE AND CARTILAGE: ICD-10-CM

## 2021-07-12 DIAGNOSIS — Z13.21 ENCOUNTER FOR VITAMIN DEFICIENCY SCREENING: ICD-10-CM

## 2021-07-12 DIAGNOSIS — E83.50 UNSPECIFIED DISORDER OF CALCIUM METABOLISM: ICD-10-CM

## 2021-07-12 DIAGNOSIS — M94.9 DISORDER OF BONE AND CARTILAGE: ICD-10-CM

## 2021-07-12 DIAGNOSIS — Z13.220 SCREENING FOR HYPERLIPIDEMIA: ICD-10-CM

## 2021-07-12 LAB
ERYTHROCYTE [DISTWIDTH] IN BLOOD BY AUTOMATED COUNT: 13.3 % (ref 10–15)
HCT VFR BLD AUTO: 41.3 % (ref 35–47)
HGB BLD-MCNC: 13.7 G/DL (ref 11.7–15.7)
MCH RBC QN AUTO: 30.5 PG (ref 26.5–33)
MCHC RBC AUTO-ENTMCNC: 33.2 G/DL (ref 31.5–36.5)
MCV RBC AUTO: 92 FL (ref 78–100)
PLATELET # BLD AUTO: 341 10E3/UL (ref 150–450)
RBC # BLD AUTO: 4.49 10E6/UL (ref 3.8–5.2)
WBC # BLD AUTO: 8.3 10E3/UL (ref 4–11)

## 2021-07-12 PROCEDURE — 85027 COMPLETE CBC AUTOMATED: CPT

## 2021-07-12 PROCEDURE — 36415 COLL VENOUS BLD VENIPUNCTURE: CPT

## 2021-07-12 PROCEDURE — 82306 VITAMIN D 25 HYDROXY: CPT

## 2021-07-13 ENCOUNTER — RECORDS - HEALTHEAST (OUTPATIENT)
Dept: ADMINISTRATIVE | Facility: CLINIC | Age: 76
End: 2021-07-13

## 2021-07-13 LAB — DEPRECATED CALCIDIOL+CALCIFEROL SERPL-MC: 37 UG/L (ref 30–80)

## 2021-07-21 ENCOUNTER — RECORDS - HEALTHEAST (OUTPATIENT)
Dept: ADMINISTRATIVE | Facility: CLINIC | Age: 76
End: 2021-07-21

## 2021-08-22 ASSESSMENT — ACTIVITIES OF DAILY LIVING (ADL): CURRENT_FUNCTION: NO ASSISTANCE NEEDED

## 2021-08-25 ENCOUNTER — OFFICE VISIT (OUTPATIENT)
Dept: INTERNAL MEDICINE | Facility: CLINIC | Age: 76
End: 2021-08-25
Payer: COMMERCIAL

## 2021-08-25 VITALS
HEIGHT: 62 IN | SYSTOLIC BLOOD PRESSURE: 126 MMHG | HEART RATE: 66 BPM | WEIGHT: 109.3 LBS | BODY MASS INDEX: 20.11 KG/M2 | DIASTOLIC BLOOD PRESSURE: 70 MMHG | OXYGEN SATURATION: 99 %

## 2021-08-25 DIAGNOSIS — R41.3 MEMORY CHANGES: ICD-10-CM

## 2021-08-25 DIAGNOSIS — Z78.0 MENOPAUSE: ICD-10-CM

## 2021-08-25 DIAGNOSIS — Z00.00 ENCOUNTER FOR PREVENTIVE CARE: Primary | ICD-10-CM

## 2021-08-25 DIAGNOSIS — E55.9 VITAMIN D DEFICIENCY: ICD-10-CM

## 2021-08-25 DIAGNOSIS — R79.89 OTHER SPECIFIED ABNORMAL FINDINGS OF BLOOD CHEMISTRY: ICD-10-CM

## 2021-08-25 DIAGNOSIS — Z11.59 NEED FOR HEPATITIS C SCREENING TEST: ICD-10-CM

## 2021-08-25 DIAGNOSIS — C92.10 CHRONIC MYELOID LEUKEMIA (H): ICD-10-CM

## 2021-08-25 LAB
ALBUMIN SERPL-MCNC: 4.3 G/DL (ref 3.5–5)
ALP SERPL-CCNC: 71 U/L (ref 45–120)
ALT SERPL W P-5'-P-CCNC: 19 U/L (ref 0–45)
ANION GAP SERPL CALCULATED.3IONS-SCNC: 9 MMOL/L (ref 5–18)
AST SERPL W P-5'-P-CCNC: 23 U/L (ref 0–40)
BILIRUB SERPL-MCNC: 0.6 MG/DL (ref 0–1)
BUN SERPL-MCNC: 11 MG/DL (ref 8–28)
CALCIUM SERPL-MCNC: 10.7 MG/DL (ref 8.5–10.5)
CHLORIDE BLD-SCNC: 106 MMOL/L (ref 98–107)
CHOLEST SERPL-MCNC: 179 MG/DL
CO2 SERPL-SCNC: 28 MMOL/L (ref 22–31)
CREAT SERPL-MCNC: 0.9 MG/DL (ref 0.6–1.1)
FASTING STATUS PATIENT QL REPORTED: NO
GFR SERPL CREATININE-BSD FRML MDRD: 62 ML/MIN/1.73M2
GLUCOSE BLD-MCNC: 102 MG/DL (ref 70–125)
HBA1C MFR BLD: 5.7 % (ref 0–5.6)
HDLC SERPL-MCNC: 89 MG/DL
LDLC SERPL CALC-MCNC: 76 MG/DL
POTASSIUM BLD-SCNC: 4.7 MMOL/L (ref 3.5–5)
PROT SERPL-MCNC: 7 G/DL (ref 6–8)
SODIUM SERPL-SCNC: 143 MMOL/L (ref 136–145)
TRIGL SERPL-MCNC: 71 MG/DL
TSH SERPL DL<=0.005 MIU/L-ACNC: 1.21 UIU/ML (ref 0.3–5)

## 2021-08-25 PROCEDURE — 82306 VITAMIN D 25 HYDROXY: CPT | Performed by: INTERNAL MEDICINE

## 2021-08-25 PROCEDURE — 99397 PER PM REEVAL EST PAT 65+ YR: CPT | Performed by: INTERNAL MEDICINE

## 2021-08-25 PROCEDURE — 36415 COLL VENOUS BLD VENIPUNCTURE: CPT | Performed by: INTERNAL MEDICINE

## 2021-08-25 PROCEDURE — 80053 COMPREHEN METABOLIC PANEL: CPT | Performed by: INTERNAL MEDICINE

## 2021-08-25 PROCEDURE — 83036 HEMOGLOBIN GLYCOSYLATED A1C: CPT | Performed by: INTERNAL MEDICINE

## 2021-08-25 PROCEDURE — 99213 OFFICE O/P EST LOW 20 MIN: CPT | Mod: 25 | Performed by: INTERNAL MEDICINE

## 2021-08-25 PROCEDURE — 86803 HEPATITIS C AB TEST: CPT | Performed by: INTERNAL MEDICINE

## 2021-08-25 PROCEDURE — 80061 LIPID PANEL: CPT | Performed by: INTERNAL MEDICINE

## 2021-08-25 PROCEDURE — 84443 ASSAY THYROID STIM HORMONE: CPT | Performed by: INTERNAL MEDICINE

## 2021-08-25 ASSESSMENT — ACTIVITIES OF DAILY LIVING (ADL): CURRENT_FUNCTION: NO ASSISTANCE NEEDED

## 2021-08-25 ASSESSMENT — MIFFLIN-ST. JEOR: SCORE: 943

## 2021-08-25 NOTE — LETTER
August 27, 2021      Monika Meyer  [address] [address] [place] [id number]        Dear ,    We are writing to inform you of your test results.    Monika, your blood work looks just fine.  Your diabetic screen-hemoglobin A1c-is mildly elevated.  This does not suggest diabetes but does suggest that you need to watch the sugars and starches in your diet.  I am otherwise, not worried.  There are no metabolic problems that may be contributing to age related memory issues.    Resulted Orders   Hepatitis C Screen Reflex to HCV RNA Quant and Genotype   Result Value Ref Range    Hepatitis C Antibody Nonreactive Nonreactive    Narrative    Assay performance characteristics have not been established for newborns, infants, and children.   Comprehensive metabolic panel   Result Value Ref Range    Sodium 143 136 - 145 mmol/L    Potassium 4.7 3.5 - 5.0 mmol/L    Chloride 106 98 - 107 mmol/L    Carbon Dioxide (CO2) 28 22 - 31 mmol/L    Anion Gap 9 5 - 18 mmol/L    Urea Nitrogen 11 8 - 28 mg/dL    Creatinine 0.90 0.60 - 1.10 mg/dL    Calcium 10.7 (H) 8.5 - 10.5 mg/dL    Glucose 102 70 - 125 mg/dL    Alkaline Phosphatase 71 45 - 120 U/L    AST 23 0 - 40 U/L    ALT 19 0 - 45 U/L    Protein Total 7.0 6.0 - 8.0 g/dL    Albumin 4.3 3.5 - 5.0 g/dL    Bilirubin Total 0.6 0.0 - 1.0 mg/dL    GFR Estimate 62 >60 mL/min/1.73m2      Comment:      As of July 11, 2021, eGFR is calculated by the CKD-EPI creatinine equation, without race adjustment. eGFR can be influenced by muscle mass, exercise, and diet. The reported eGFR is an estimation only and is only applicable if the renal function is stable.   Hemoglobin A1c   Result Value Ref Range    Hemoglobin A1C 5.7 (H) 0.0 - 5.6 %      Comment:      Normal <5.7%   Prediabetes 5.7-6.4%    Diabetes 6.5% or higher     Note: Adopted from ADA consensus guidelines.   TSH with free T4 reflex   Result Value Ref Range    TSH 1.21 0.30 - 5.00 uIU/mL   Vitamin D Deficiency   Result Value Ref Range     Vitamin D, Total (25-Hydroxy) 42 30 - 80 ug/L    Narrative    Deficiency <10.0 ug/L  Insufficiency 10.0-29.9 ug/L  Sufficiency 30.0-80.0 ug/L  Toxicity (possible) >100.0 ug/L    Lipid panel reflex to direct LDL Non-fasting   Result Value Ref Range    Cholesterol 179 <=199 mg/dL    Triglycerides 71 <=149 mg/dL    Direct Measure HDL 89 >=50 mg/dL      Comment:      HDL Cholesterol Reference Range:     0-2 years:   No reference ranges established for patients under 2 years old  at [facility] for lipid analytes.    2-8 years:  Greater than 45 mg/dL     18 years and older:   Female: Greater than or equal to 50 mg/dL   Male:   Greater than or equal to 40 mg/dL    LDL Cholesterol Calculated 76 <=129 mg/dL    Patient Fasting > 8hrs? No        If you have any questions or concerns, please call the clinic at the number listed above.       Sincerely,      Lizabeth Turcios MD

## 2021-08-25 NOTE — LETTER
August 27, 2021      Monika Meyer  [address] [address] [place] [id number]        Dear ,    We are writing to inform you of your test results.    Monika, your blood work is all fine.  There is nothing abnormal in your overall metabolic profile that would be a cause for any kind of cognitive changes.    Resulted Orders   Hepatitis C Screen Reflex to HCV RNA Quant and Genotype   Result Value Ref Range    Hepatitis C Antibody Nonreactive Nonreactive    Narrative    Assay performance characteristics have not been established for newborns, infants, and children.   Comprehensive metabolic panel   Result Value Ref Range    Sodium 143 136 - 145 mmol/L    Potassium 4.7 3.5 - 5.0 mmol/L    Chloride 106 98 - 107 mmol/L    Carbon Dioxide (CO2) 28 22 - 31 mmol/L    Anion Gap 9 5 - 18 mmol/L    Urea Nitrogen 11 8 - 28 mg/dL    Creatinine 0.90 0.60 - 1.10 mg/dL    Calcium 10.7 (H) 8.5 - 10.5 mg/dL    Glucose 102 70 - 125 mg/dL    Alkaline Phosphatase 71 45 - 120 U/L    AST 23 0 - 40 U/L    ALT 19 0 - 45 U/L    Protein Total 7.0 6.0 - 8.0 g/dL    Albumin 4.3 3.5 - 5.0 g/dL    Bilirubin Total 0.6 0.0 - 1.0 mg/dL    GFR Estimate 62 >60 mL/min/1.73m2      Comment:      As of July 11, 2021, eGFR is calculated by the CKD-EPI creatinine equation, without race adjustment. eGFR can be influenced by muscle mass, exercise, and diet. The reported eGFR is an estimation only and is only applicable if the renal function is stable.   Hemoglobin A1c   Result Value Ref Range    Hemoglobin A1C 5.7 (H) 0.0 - 5.6 %      Comment:      Normal <5.7%   Prediabetes 5.7-6.4%    Diabetes 6.5% or higher     Note: Adopted from ADA consensus guidelines.   TSH with free T4 reflex   Result Value Ref Range    TSH 1.21 0.30 - 5.00 uIU/mL   Vitamin D Deficiency   Result Value Ref Range    Vitamin D, Total (25-Hydroxy) 42 30 - 80 ug/L    Narrative    Deficiency <10.0 ug/L  Insufficiency 10.0-29.9 ug/L  Sufficiency 30.0-80.0 ug/L  Toxicity (possible) >100.0 ug/L     Lipid panel reflex to direct LDL Non-fasting   Result Value Ref Range    Cholesterol 179 <=199 mg/dL    Triglycerides 71 <=149 mg/dL    Direct Measure HDL 89 >=50 mg/dL      Comment:      HDL Cholesterol Reference Range:     0-2 years:   No reference ranges established for patients under 2 years old  at [facility] for lipid analytes.    2-8 years:  Greater than 45 mg/dL     18 years and older:   Female: Greater than or equal to 50 mg/dL   Male:   Greater than or equal to 40 mg/dL    LDL Cholesterol Calculated 76 <=129 mg/dL    Patient Fasting > 8hrs? No        If you have any questions or concerns, please call the clinic at the number listed above.       Sincerely,      Lizabeth Turcios MD

## 2021-08-25 NOTE — PROGRESS NOTES
ANNUAL WELLNESS VISIT    Assessment and Plan:     DX: 1. Encounter for preventive care  Up-to-date on mammography-history of breast cancer/colon cancer screening due.  She has had previous polyps and was contacted by [facility].  I have encouraged her to follow through with this.  Immunizations are up-to-date.  Bone density is due.  Ophthalmologic, dental and Derm care are up-to-date.    She goes for regular daily walks.  Although BMI is low, it has been stable.    - Comprehensive metabolic panel; Future  - Hemoglobin A1c; Future  - Lipid panel reflex to direct LDL Non-fasting; Future  - Comprehensive metabolic panel  - Hemoglobin A1c  - Lipid panel reflex to direct LDL Non-fasting    2. Need for hepatitis C screening test  Ordered  - Hepatitis C Screen Reflex to HCV RNA Quant and Genotype; Future  - Hepatitis C Screen Reflex to HCV RNA Quant and Genotype    3. Memory changes  Concerns regarding mild cognitive change.  States she is somewhat slow to recall different things.  It does not necessarily impair or hamper activities of daily living.  However, she finds that she is avoiding driving places and has to ask her  repeat questions.    Does acknowledge some anxiety.  Denies depression.  Easily stressed.  Recommendation: Would like to proceed with neuropsych test.  I suspect that anxiety may be contributing to a decrease in concentration.  This may be helpful in further directing care.  We do have the fortune of having a recent MRI of the brain to evaluate.  She gets 1 for evaluation of meningioma.  There has been no recurrent meningioma.  Age-related atrophy is noted.  Microvascular changes noted.    - MENTAL HEALTH REFERRAL  - Adult; Assessments and Testing; Neuropsychological Testing; [facility] - Neuropsychology - [facility] - [phone]; We will contact you to schedule the appointment or please call with any questions; Future  - Hemoglobin A1c; Future  - TSH with free T4 reflex;  Future  - Hemoglobin A1c  - TSH with free T4 reflex    4. Vitamin D deficiency  We will update labs  - Vitamin D Deficiency; Future  - Vitamin D Deficiency    5. Other specified abnormal findings of blood chemistry   We will update labs  - Hemoglobin A1c; Future  - Hemoglobin A1c    6. Chronic Granulocytic Leukemia  Followed by hematology-hemogram normal    7. Menopause  Due for an update  - DX Hip/Pelvis/Spine; Future       There are no Patient Instructions on file for this visit.       The patient's current medical problems were reviewed.        The following health maintenance items are reviewed in Epic and correct as of today:  Health Maintenance Due   Topic Date Due     ANNUAL REVIEW OF HM ORDERS  Never done     HEPATITIS C SCREENING  Never done     FALL RISK ASSESSMENT  08/21/2021     MEDICARE ANNUAL WELLNESS VISIT  08/21/2021     INFLUENZA VACCINE (1) 09/01/2021       Appropriate preventive services were discussed with this patient, including applicable screening as appropriate for cardiovascular disease, diabetes, osteopenia/osteoporosis, and glaucoma.  As appropriate for age/gender, discussed screening for colorectal cancer, prostate cancer, breast cancer, and cervical cancer. Checklist reviewing preventive services available has been given to the patient.    Reviewed patients plan of care and provided an AVS. The Basic Care Plan for Monika meets the Care Plan requirement. This Care Plan has been established and reviewed with the Patient, and printed in the AVS.    The following health maintenance schedule was reviewed with the patient and provided in printed form in the after visit summary:   Health Maintenance   Topic Date Due     ANNUAL REVIEW OF HM ORDERS  Never done     HEPATITIS C SCREENING  Never done     FALL RISK ASSESSMENT  08/21/2021     MEDICARE ANNUAL WELLNESS VISIT  08/21/2021     INFLUENZA VACCINE (1) 09/01/2021     DTAP/TDAP/TD IMMUNIZATION (2 - Td or Tdap) 10/24/2022     LIPID  08/19/2025      ADVANCE CARE PLANNING  08/21/2025     DEXA  08/15/2034     PHQ-2  Completed     Pneumococcal Vaccine: 65+ Years  Completed     ZOSTER IMMUNIZATION  Completed     COVID-19 Vaccine  Completed     IPV IMMUNIZATION  Aged Out     MENINGITIS IMMUNIZATION  Aged Out     HEPATITIS B IMMUNIZATION  Aged Out        Subjective:   Monika is a 76 year old female who presents to the clinic today for an Annual Wellness Visit.    CHIEF COMPLAINT:  Chief Complaint   Patient presents with     Physical     AWV       HPI: Monika is a delightful 76-year-old female who is here today for an annual wellness visit.  Her medical history is significant for a remote history of breast cancer.  She also is being followed for chronic granulocytic leukemia and currently has been off Gleevec.  Last treatment was in 2019.  She has a history of meningioma-status post resection-recent MRI negative for recurrence.    Overall, she is doing well.  She is feeling very stressed with the pandemic.  She states having multiple in-home visitors has been particularly stressful for her.  She worries about the world, crime, turmoil/etc.  Also, in this context, she worries about her memory.  She states that she has some difficulty with driving and directions.  She states sometimes she feels overwhelmed and cannot remember things.  She does find rnonell in reading books.  She is very physically active and exercising regularly.  She tends to avoid activities that cause stress for her.  Of note, hearing is intact.    Review of Systems: Describes some mild memory issues as described above.  With family history of Alzheimer's, she is interested in pursuing evaluation.  She denies any chest pain, shortness of breath, new bowel or bladder issues.  She denies any constitutional symptoms such as night sweats, weight loss, poor appetite, etc.  Comprehensive is reviewed and is significant with that mentioned above.    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     Breast Cancer     Chronic Granulocytic Leukemia     Cerumen Impaction In The Left Ear     Mild atherosclerosis of carotid artery, bilateral     Osteopenia     Postmenopausal Atrophic Vaginitis     Past Medical History:   Diagnosis Date     Breast cancer (H)       Past Surgical History:   Procedure Laterality Date     C LIGATE FALLOPIAN TUBE      Description: Tubal Ligation;  Recorded: 10/12/2010;     CRANIOTOMY  2004     EXCISE BREAST CYST/FIBROADENOMA/TUMOR/DUCT LESION/NIPPLE LESION/AREOLAR LESION      Description: Breast Surgery Lumpectomy;  Recorded: 10/12/2010;     LUMPECTOMY BREAST Left      FL EXCIS INFRATENT MENINGIOMA      Description: Craniotomy Suboccipital Excision Of Meningioma;  Recorded: 10/12/2010;     TONSILLECTOMY        Family History   Problem Relation Age of Onset     Hypertension Mother       Social History     Socioeconomic History     Marital status:      Spouse name: Not on file     Number of children: Not on file     Years of education: Not on file     Highest education level: Not on file   Occupational History     Not on file   Tobacco Use     Smoking status: Former Smoker     Quit date: 3/27/1987     Years since quittin.4     Smokeless tobacco: Never Used   Substance and Sexual Activity     Alcohol use: Yes     Drug use: No     Sexual activity: Not on file   Other Topics Concern     Not on file   Social History Narrative     Not on file     Social Determinants of Health     Financial Resource Strain:      Difficulty of Paying Living Expenses:    Food Insecurity:      Worried About Running Out of Food in the Last Year:      Ran Out of Food in the Last Year:    Transportation Needs:      Lack of Transportation (Medical):      Lack of Transportation (Non-Medical):    Physical Activity:      Days of Exercise per Week:      Minutes of  "Exercise per Session:    Stress:      Feeling of Stress :    Social Connections:      Frequency of Communication with Friends and Family:      Frequency of Social Gatherings with Friends and Family:      Attends Protestant Services:      Active Member of Clubs or Organizations:      Attends Club or Organization Meetings:      Marital Status:    Intimate Partner Violence:      Fear of Current or Ex-Partner:      Emotionally Abused:      Physically Abused:      Sexually Abused:       Social History     Social History Narrative     Not on file       Current Outpatient Medications   Medication Sig Dispense Refill     ascorbic acid (ASCORBIC ACID WITH SOHAN HIPS) 500 MG tablet [ASCORBIC ACID (ASCORBIC ACID WITH SOHAN HIPS) 500 MG TABLET] Take 500 mg by mouth daily.       aspirin 81 MG EC tablet [ASPIRIN 81 MG EC TABLET] Take 81 mg by mouth daily.       atorvastatin (LIPITOR) 10 MG tablet [ATORVASTATIN (LIPITOR) 10 MG TABLET] TAKE ONE TABLET BY MOUTH ONE TIME DAILY  90 tablet 0     CALCIUM CITRATE/VITAMIN D3 (CITRACAL + D ORAL) [CALCIUM CITRATE/VITAMIN D3 (CITRACAL + D ORAL)] Take 2 capsules by mouth daily.        cholecalciferol, vitamin D3, 1,000 unit capsule [CHOLECALCIFEROL, VITAMIN D3, 1,000 UNIT CAPSULE] Take 1,000 Units by mouth daily.       magnesium 250 mg Tab [MAGNESIUM 250 MG TAB] Take 1 tablet by mouth daily.       Multiple Vitamins-Minerals (MULTI COMPLETE PO) Take 1 tablet by mouth daily       atorvastatin (LIPITOR) 10 MG tablet [ATORVASTATIN (LIPITOR) 10 MG TABLET] TAKE ONE TABLET BY MOUTH ONE TIME DAILY  90 tablet 3     citalopram (CELEXA) 10 MG tablet [CITALOPRAM (CELEXA) 10 MG TABLET] Half tablet daily for the first 2 weeks.  If tolerating well, increase to 1 tablet daily. 30 tablet 2      Objective:   There were no vitals taken for this visit. Estimated body mass index is 19.21 kg/m  as calculated from the following:    Height as of 8/21/20: 1.588 m (5' 2.5\").    Weight as of 8/21/20: 48.4 kg (106 lb 12 " oz).    VisionScreening:  No exam data present     PHYSICAL EXAM   EYES: Eyelids, conjunctiva, and sclera were normal. Pupils were normal. Cornea, iris, and lens were normal bilaterally.  HEAD, EARS, NOSE, MOUTH, AND THROAT: Head and face were normal. Hearing was normal to voice and the ears were normal to external exam. Nose appearance was normal and there was no discharge. Oropharynx was normal.  TMs were normal.  NECK: Neck appearance was normal. There were no neck masses and the thyroid was not enlarged.  RESPIRATORY: Breathing pattern was normal and the chest moved symmetrically.   Lung sounds were equal bilaterally.  CARDIOVASCULAR: Heart rate and rhythm were normal.  S1 and S2 were normal and there were no extra sounds or murmurs. Peripheral pulses in arms and legs were normal.  Jugular venous pressure was normal.  There was no peripheral edema.  GASTROINTESTINAL: The abdomen was normal in contour.  Bowel sounds were present.   Palpation detected no tenderness, mass, or enlarged organs.   MUSCULOSKELETAL: Skeletal configuration was normal and muscle mass was normal for age. Joint appearance was overall normal.  LYMPHATIC: There were no enlarged nodes.  SKIN/HAIR/NAILS: Skin color was normal.  There were no abnormal skin lesions.  Hair and nails were normal.  NEUROLOGIC: The patient was alert and oriented to person, place, time, and circumstance. Speech was normal. Cranial nerves were normal. Motor strength was normal for age. The patient was normally coordinated.  PSYCHIATRIC:  Mood and affect were normal and the patient had normal recent and remote memory. The patient's judgment and insight were normal.  Breast exam is normal          No flowsheet data found.  Cognitive Screening   1) Repeat 3 items (Leader, Season, Table)    2) Clock draw: NORMAL  3) 3 item recall: Recalls 2 objects   Results: NORMAL clock, 1-2 items recalled: COGNITIVE IMPAIRMENT LESS LIKELY    Mini-CogTM Copyright S Viv. Licensed by  "the author for use in [facility]; reprinted with permission (sebastian@[email or web address]). All rights reserved.    A Mini-Cog score of 0-2 suggests the possibility of dementia, score of 3-5 suggests no dementia    ROOMING STAFF:    Patient has been advised of split billing requirements and indicates understanding: Yes   Are you in the first 12 months of your Medicare coverage?  No    Do you feel safe in your environment? Yes    Have you ever done Advance Care Planning? (For example, a Health Directive, POLST, or a discussion with a medical provider or your loved ones about your wishes): Advanced care planning on file      Fall risk  Fallen 2 or more times in the past year?: No  Any fall with injury in the past year?: No  click delete button to remove this line now    Healthy Habits:     In general, how would you rate your overall health?  Good    Frequency of exercise:  2-3 days/week    Duration of exercise:  N/A    Do you usually eat at least 4 servings of fruit and vegetables a day, include whole grains    & fiber and avoid regularly eating high fat or \"junk\" foods?  No    Taking medications regularly:  Yes    Ability to successfully perform activities of daily living:  No assistance needed    Home Safety:  No safety concerns identified    Hearing Impairment:  No hearing concerns    In the past 6 months, have you been bothered by leaking of urine?  No    In general, how would you rate your overall mental or emotional health?  Fair      PHQ-2 Total Score: 2    Additional concerns today:  Yes      Do you have sleep apnea, excessive snoring or daytime drowsiness?: no        The visit lasted a total of 45 minutes     Reviewed and updated as needed this visit by clinical staff   Allergies  Meds              Lizabeth Tucrios MD  [facility]"

## 2021-08-26 LAB
DEPRECATED CALCIDIOL+CALCIFEROL SERPL-MC: 42 UG/L (ref 30–80)
HCV AB SERPL QL IA: NONREACTIVE

## 2021-09-02 ENCOUNTER — ANCILLARY PROCEDURE (OUTPATIENT)
Dept: BONE DENSITY | Facility: CLINIC | Age: 76
End: 2021-09-02
Attending: INTERNAL MEDICINE
Payer: COMMERCIAL

## 2021-09-02 DIAGNOSIS — Z78.0 MENOPAUSE: ICD-10-CM

## 2021-09-02 PROCEDURE — 77080 DXA BONE DENSITY AXIAL: CPT | Mod: TC | Performed by: RADIOLOGY

## 2021-10-16 ENCOUNTER — HEALTH MAINTENANCE LETTER (OUTPATIENT)
Age: 76
End: 2021-10-16

## 2021-10-20 ENCOUNTER — TRANSFERRED RECORDS (OUTPATIENT)
Dept: HEALTH INFORMATION MANAGEMENT | Facility: CLINIC | Age: 76
End: 2021-10-20
Payer: COMMERCIAL

## 2021-12-13 ENCOUNTER — OFFICE VISIT (OUTPATIENT)
Dept: NEUROLOGY | Facility: CLINIC | Age: 76
End: 2021-12-13
Attending: INTERNAL MEDICINE
Payer: COMMERCIAL

## 2021-12-13 DIAGNOSIS — F41.9 ANXIETY DISORDER, UNSPECIFIED TYPE: ICD-10-CM

## 2021-12-13 DIAGNOSIS — F06.70 MILD NEUROCOGNITIVE DISORDER DUE TO MULTIPLE ETIOLOGIES: Primary | ICD-10-CM

## 2021-12-13 DIAGNOSIS — R41.3 MEMORY CHANGES: ICD-10-CM

## 2021-12-13 PROCEDURE — 96136 PSYCL/NRPSYC TST PHY/QHP 1ST: CPT | Performed by: CLINICAL NEUROPSYCHOLOGIST

## 2021-12-13 PROCEDURE — 96116 NUBHVL XM PHYS/QHP 1ST HR: CPT | Performed by: CLINICAL NEUROPSYCHOLOGIST

## 2021-12-13 PROCEDURE — 96137 PSYCL/NRPSYC TST PHY/QHP EA: CPT | Performed by: CLINICAL NEUROPSYCHOLOGIST

## 2021-12-13 PROCEDURE — 96139 PSYCL/NRPSYC TST TECH EA: CPT | Mod: 59 | Performed by: CLINICAL NEUROPSYCHOLOGIST

## 2021-12-13 PROCEDURE — 96132 NRPSYC TST EVAL PHYS/QHP 1ST: CPT | Performed by: CLINICAL NEUROPSYCHOLOGIST

## 2021-12-13 PROCEDURE — 96138 PSYCL/NRPSYC TECH 1ST: CPT | Mod: 59 | Performed by: CLINICAL NEUROPSYCHOLOGIST

## 2021-12-13 PROCEDURE — 96133 NRPSYC TST EVAL PHYS/QHP EA: CPT | Performed by: CLINICAL NEUROPSYCHOLOGIST

## 2021-12-13 NOTE — PROGRESS NOTES
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 limitations in mind.    TESTS ADMINISTERED:   [place] Judgment of Line Orientation- Form V (PATRICK), [place] Naming Test (BNT), Brief Visuospatial Memory Test - R Form 1 (BVMT-R), Category Fluency (CAT), Clock Drawing, Controlled Oral Word Association Test CFL (COWAT), Generalized Anxiety Disorder-7 (CARSON-7), Geriatric Depression Scale 30 (GDS), [place] Verbal Learning Test - R Form 1 (HVLT-R), Paco-Osterrieth Complex Figure Test, copy only (RCFT), Stroop Color and Word Test, Trail Making Test (TMT), WAIS-IV (Similarities, Information, Block Design, Matrix Reasoning, Digit Span, Coding), WMS-IV (Logical Memory, Visual Reproduction), WRAT-4 Word Reading (green), [place] Card Sorting Test-1 deck (WCST) and WMS-III Information and Orientation.    MOANS norms were used for BNT, CAT, COWAT,PATRICK, RCFT, Stroop, TMT    DESCRIPTIVE PERFORMANCE KEY:    Labels for tests with Normal Distributions  Score Label Standard Score %ile Rank   Exceptionally high score  > 130 > 98   Above average score 120-129 91-97   High average score 110-119 75-90   Average score  25-74   Low average score 80-89 9-24   Below average score 70-79 2-8   Exceptionally low score < 70 < 2      Labels for tests with Non-Normal Distributions  Score Label %ile Rank   Within normal expectations/ limits score (WNL) > 24   Low average score 9-24   Below average score 2-8   Exceptionally low score < 2     The following test results utilize score labels as adapted from Kelby Shah, Omar Alvarado, Nisreen Lee, SANDI Waller, Nikky Ramos, Kevin Boyd, Chau Lr & Conference Participants (2020): American Academy of Clinical Neuropsychology consensus conference statement on uniform labeling of performance test scores, The Clinical Neuropsychologist, DOI: 10.1080/[email or web address]    All scores contain some measure of error; scores are reported here as they are obtained by the individual (without reference to the range of error). These are meant as labels and not interpretation of performance. While other relevant comments regarding task performance are provided below, please see the Assessment, Impressions and Diagnostic Summary sections of this report for interpretation of the scores and the cognitive profile as a whole, including what does and does not constitute impairment.    OPTIMAL PREMORBID INTELLECT:  Optimal premorbid intellectual abilities were estimated as falling in the average range based on Ms. Hernandezs educational and occupational histories and performance on tasks least likely to be affected by acquired brain dysfunction (i.e.,  hold tests ).    SUMMARY OF TEST RESULTS:     Orientation, Attention and Processing Speed  Mental status exam was measured as a within normal limits score for her age. She was oriented to person, place, time, and date and was able to correctly name the current and previous presidents.    Performance on a measure of basic attention and working memory was assessed as an average score. This reflected a high average score for basic attention skills (LDF = 7) and working memory skills that ranged from a below average score (LDB = 2)  to an average score (LDS = 4).    A simple sequencing task was assessed as a below average score. In contrast, a speeded word reading task, a speeded color naming task and a digit symbol substitution task were all measured as an average score.     Language  Sight word reading skills were assessed as an average score. Verbal abstraction skills were assessed as an average score. Fund of general knowledge was assessed as a low average score. Her performance on a measure of semantic fluency was measured as a below average to low average score. Confrontation naming was measured as a low average score (44/60).    Visuospatial Skills  Performance on a block construction task resulted in a low average score. Performance on a pattern completion task was measured as an average score. Visuospatial judgement was assessed as an average score.     Learning and Memory  Immediate memory for two short stories was measured as an average score while delayed recall of these stories resulted in a low average score (47% retention). Recognition memory was measured as a below average score (13/23). She was also administered a measure of rote auditory verbal list learning that required her to learn a series of 12 words over three trials and retain and recall them over a delay. Her initial rate of learning (6,9,7) reflected an average score. She retained and recalled 5 words (56% retention) after the delay for a low average score for delayed recall.  Recognition memory was measured as an average score as she correctly identified 10 of the original words and made no false positive errors.     Immediate nonverbal memory (1,1, 2) for a series of 6 geometric figures was measured as an exceptionally low score, and delayed recall of these figures (50% retention) also resulted in an exceptionally low score. Recognition memory was measured as a below average score as she correctly identified only 3 of the original figures but made no false positive  errors.    On a single trial nonverbal memory measure, immediate nonverbal memory for a series of geometric figures was assessed as a low average score. Delayed recall of these figures was measured as an exceptionally low score (0% retention). Recognition memory was measured as a low average score.as she correctly identified 2 of the original 7 figures in a multiple choice format.    Executive Functioning  Working memory skills ranged from a below average score (LDB = 2) to an average score (LDS = 4). A complex sequencing and set shifting task was measured as an exceptionally low score. This task was notable for 2 errors. Her performance on a measure of phonemic fluency resulted in a high average score. On a measure of deductive reasoning and problem-solving, overall performance was assessed as a within normal limits score for her age and education in terms of the number of categories learned (2). Her performance was characterized by a mildly error prone response style (below average score). Her ability to inhibit a dominant response was assessed as a low average score. Her performance on this task was notable for 5 errors. Her copy of a complex figure was performed as a below average score for her age, discontinued at 10 minutes, and notable for significant difficulty integrating detail especially on the left side of the figure, a piecemeal approach and poor planning. Her drawing of a clock was notable for not including the 1 and failure to distinguish the size of the hands.     Mood  On the Geriatric Depression Scale-30 (GDS), a self report measure of depressive symptomatology, she obtained a score of 2, placing her in the range of no significant symptoms of depression.     On the Generalized Anxiety Disorder-7, a self-report measure of anxiety, she obtained a score of 0, placing her in the range of no significant anxiety.     EVALUATION SERVICES AND TIME:   A clinical interview/neurobehavioral status examination  was conducted with the patient and documented. I thoroughly reviewed the medical record, selected the neuropsychological test battery, provided supervision to the individual who administered and scored the neuropsychological test battery, interpreted/integrated patient data and test results, engaged in clinical decision making, treatment planning, report writing/preparation and provided and documented interactive feedback of test results on the day of testing. A trained examiner/technician directly administered 2+ of the neuropsychological tests and I (Psychologist) scored the neuropsychological tests (2 + tests). Please see below for a breakdown of time spent and the associated codes billed for these services.     Services   Time Spent  CPT Codes   Neurobehavioral Status Exam:  (e.g., face-to-face, interpretation, report) 50 minutes 1 x 96116   Neuropsychological Evaluation Services:   (e.g., integration, interpretation, treatment planning, clinical decision making, feedback)   215 minutes   1 x 96132  3 x 96133   Neuropsychological Testing by Psychologist:  (e.g., test administration, scoring, 2+ tests administered)   60 minutes   1 x 96136  1 x 96137   Neuropsychological Testing by Trained Examiner/Technician:  (e.g., test administration, scoring, 2+ tests administered)   154 minutes   1 x 96138  4 x 96139     Diagnosis:  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 Feedback of results was provided on the day of testing.        Shelley Jane, PhD, LP, ABPP  Clinical Neuropsychologist, [id number]  Board Certified in Clinical Neuropsychology    [facility]  [address] , [address]  [place] [address]  Phone:  [phone]

## 2021-12-13 NOTE — LETTER
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 methods has been evaluated only in part by scientific research. While every effort was made to simulate standard assessment practices, the diagnostic conclusions and recommendations for treatment provided in this report are being advanced with these limitations in mind.    TESTS ADMINISTERED:   [place] Judgment of Line Orientation- Form V (PATRICK), [place] Naming Test (BNT), Brief Visuospatial Memory Test - R Form 1 (BVMT-R), Category Fluency (CAT), Clock Drawing, Controlled Oral Word Association Test CFL (COWAT), Generalized Anxiety Disorder-7 (CARSON-7), Geriatric Depression Scale 30 (GDS), [place] Verbal Learning Test - R Form 1 (HVLT-R), Paco-Osterrieth Complex Figure Test, copy only (RCFT), Stroop Color and Word Test, Trail Making Test (TMT), WAIS-IV (Similarities, Information, Block Design, Matrix Reasoning, Digit Span, Coding), WMS-IV (Logical Memory, Visual Reproduction), WRAT-4 Word Reading (green), [place] Card Sorting Test-1 deck (WCST) and WMS-III Information and Orientation.    MOANS norms were used for BNT, CAT, COWAT,PATRICK, RCFT, Stroop, TMT    DESCRIPTIVE PERFORMANCE KEY:    Labels for tests with Normal Distributions  Score Label Standard Score %ile Rank    Exceptionally high score  > 130 > 98   Above average score 120-129 91-97   High average score 110-119 75-90   Average score  25-74   Low average score 80-89 9-24   Below average score 70-79 2-8   Exceptionally low score < 70 < 2     Labels for tests with Non-Normal Distributions  Score Label %ile Rank   Within normal expectations/ limits score (WNL) > 24   Low average score 9-24   Below average score 2-8   Exceptionally low score < 2     The following test results utilize score labels as adapted from Kelby Shah, Omar Alvarado, Nisreen Lee, SANDI Waller, Nikky Ramos, Kevin Boyd, Chau Lr & Conference Participants (2020): American Academy of Clinical Neuropsychology consensus conference statement on uniform labeling of performance test scores, The Clinical Neuropsychologist, DOI: 10.1080/[email or web address]    All scores contain some measure of error; scores are reported here as they are obtained by the individual (without reference to the range of error). These are meant as labels and not interpretation of performance. While other relevant comments regarding task performance are provided below, please see the Assessment, Impressions and Diagnostic Summary sections of this report for interpretation of the scores and the cognitive profile as a whole, including what does and does not constitute impairment.    OPTIMAL PREMORBID INTELLECT:  Optimal premorbid intellectual abilities were estimated as falling in the average range based on Ms. Meyer's educational and occupational histories and performance on tasks least likely to be affected by acquired brain dysfunction (i.e.,  hold tests ).    SUMMARY OF TEST RESULTS:     Orientation, Attention and Processing Speed  Mental status exam was measured as a within normal limits score for her age. She was oriented to person, place, time, and date and was able to correctly name the current and previous  presidents.    Performance on a measure of basic attention and working memory was assessed as an average score. This reflected a high average score for basic attention skills (LDF = 7) and working memory skills that ranged from a below average score (LDB = 2) to an average score (LDS = 4).    A simple sequencing task was assessed as a below average score. In contrast, a speeded word reading task, a speeded color naming task and a digit symbol substitution task were all measured as an average score.     Language  Sight word reading skills were assessed as an average score. Verbal abstraction skills were assessed as an average score. Fund of general knowledge was assessed as a low average score. Her performance on a measure of semantic fluency was measured as a below average to low average score. Confrontation naming was measured as a low average score (44/60).    Visuospatial Skills  Performance on a block construction task resulted in a low average score. Performance on a pattern completion task was measured as an average score. Visuospatial judgement was assessed as an average score.     Learning and Memory  Immediate memory for two short stories was measured as an average score while delayed recall of these stories resulted in a low average score (47% retention). Recognition memory was measured as a below average score (13/23). She was also administered a measure of rote auditory verbal list learning that required her to learn a series of 12 words over three trials and retain and recall them over a delay. Her initial rate of learning (6,9,7) reflected an average score. She retained and recalled 5 words (56% retention) after the delay for a low average score for delayed recall.  Recognition memory was measured as an average score as she correctly identified 10 of the original words and made no false positive errors.     Immediate nonverbal memory (1,1, 2) for a series of 6 geometric figures was measured as an  exceptionally low score, and delayed recall of these figures (50% retention) also resulted in an exceptionally low score. Recognition memory was measured as a below average score as she correctly identified only 3 of the original figures but made no false positive errors.    On a single trial nonverbal memory measure, immediate nonverbal memory for a series of geometric figures was assessed as a low average score. Delayed recall of these figures was measured as an exceptionally low score (0% retention). Recognition memory was measured as a low average score.as she correctly identified 2 of the original 7 figures in a multiple choice format.    Executive Functioning  Working memory skills ranged from a below average score (LDB = 2) to an average score (LDS = 4). A complex sequencing and set shifting task was measured as an exceptionally low score. This task was notable for 2 errors. Her performance on a measure of phonemic fluency resulted in a high average score. On a measure of deductive reasoning and problem-solving, overall performance was assessed as a within normal limits score for her age and education in terms of the number of categories learned (2). Her performance was characterized by a mildly error prone response style (below average score). Her ability to inhibit a dominant response was assessed as a low average score. Her performance on this task was notable for 5 errors. Her copy of a complex figure was performed as a below average score for her age, discontinued at 10 minutes, and notable for significant difficulty integrating detail especially on the left side of the figure, a piecemeal approach and poor planning. Her drawing of a clock was notable for not including the 1 and failure to distinguish the size of the hands.     Mood  On the Geriatric Depression Scale-30 (GDS), a self report measure of depressive symptomatology, she obtained a score of 2, placing her in the range of no significant  symptoms of depression.     On the Generalized Anxiety Disorder-7, a self-report measure of anxiety, she obtained a score of 0, placing her in the range of no significant anxiety.     EVALUATION SERVICES AND TIME:   A clinical interview/neurobehavioral status examination was conducted with the patient and documented. I thoroughly reviewed the medical record, selected the neuropsychological test battery, provided supervision to the individual who administered and scored the neuropsychological test battery, interpreted/integrated patient data and test results, engaged in clinical decision making, treatment planning, report writing/preparation and provided and documented interactive feedback of test results on the day of testing. A trained examiner/technician directly administered 2+ of the neuropsychological tests and I (Psychologist) scored the neuropsychological tests (2 + tests). Please see below for a breakdown of time spent and the associated codes billed for these services.     Services   Time Spent  CPT Codes   Neurobehavioral Status Exam:  (e.g., face-to-face, interpretation, report) 50 minutes 1 x 96116   Neuropsychological Evaluation Services:   (e.g., integration, interpretation, treatment planning, clinical decision making, feedback)   215 minutes   1 x 96132  3 x 96133   Neuropsychological Testing by Psychologist:  (e.g., test administration, scoring, 2+ tests administered)   60 minutes   1 x 96136  1 x 96137   Neuropsychological Testing by Trained Examiner/Technician:  (e.g., test administration, scoring, 2+ tests administered)   154 minutes   1 x 96138  4 x 96139     Diagnosis:  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 Feedback of results was provided on the day of testing.       Shelley Jane, PhD, LP, [facility]  Clinical Neuropsychologist, [id number]  Board Certified in Clinical Neuropsychology    [facility] [address] , [address]  [place] [address]  Phone:  [phone]    The patient was seen for a neuropsychological evaluation for the purposes of diagnostic clarification and treatment planning. 154 minutes of face-to-face testing were provided by this writer. The patient was cooperative with testing. No concerns were brought to my attention. Please see Dr. Jane's report for a detailed description of the charges and interpretation and integration of the findings.      Again, thank you for allowing me to participate in the care of your patient.        Sincerely,        Shelley Jane, PhD LP

## 2021-12-13 NOTE — PROGRESS NOTES
The patient was seen for a neuropsychological evaluation for the purposes of diagnostic clarification and treatment planning. 154 minutes of face-to-face testing were provided by this writer. The patient was cooperative with testing. No concerns were brought to my attention. Please see Dr. Jane's report for a detailed description of the charges and interpretation and integration of the findings.

## 2021-12-21 NOTE — PATIENT INSTRUCTIONS
DIAGNOSTIC IMPRESSIONS (from 12/13/2021 Neuropsychology Consult):    Results  Impairments in complex visuospatial processing and nonverbal memory with more subtle weaknesses in verbal memory and semantic retrieval. Elevated anxiety likely exacerbating these weaknesses and contributing to fluctuations in cognitive abilities in everyday life.    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 Meyer will continue to benefit from help in her daily activities particularly in terms of managing the checkbook/ finances as this appears to be a source of stress for her.   o Given her elevated stress related to driving as well as reduced spatial skills, it is recommended that Ms. Meyer continue to limit her driving.     If not already done, completion of paperwork for advance directives and assignment of healthcare and financial power of  should be considered at this time.  Physical and Emotional Health    Given her significant symptoms of anxiety, it is recommended that Ms. Meyre consider engaging in psychotherapy services to address her mood symptoms. We have a provider in clinic Dr. Mary Carmen Galdamez ([phone]) that might be a good option. She is welcome to call and schedule an appointment at any time. Dr. Galdamez currently offers both virtual and in-person appointments. Otherwise, she can contact her insurance carrier to find covered providers in her area.      A trial of medication would also be warranted to help manage mood symptoms. Per records, Celexa has already been prescribed for Ms. Meyer but she has so far chosen not to take it. She is encouraged to speak with her physician about concerns she may have about side effects and weigh this against the significant impact  ongoing stress is having on her everyday life.     In the meantime, behavioral activation techniques such as continued regular exercise (under the guidance of her physician), recreational activities and regular social interaction (with proper social distancing when indicated) would likely be effective in helping Ms. Meyer to manage her mood.     If not medically contraindicated, Ms. Meyer may benefit from a trial of a cognitive enhancing medication (I.e., donepezil). She is encouraged to speak with her physician about this.    It is important that Ms. Meyer continue to follow her medication treatment regimen so as to maintain her physical health, as this can have a significant impact on her physical, emotional, and cognitive functioning.   Memory and Organization    Ms. Meyer is encouraged to continue to engage in stimulating activities, (i.e., reading, card games, puzzles) to keep her cognitively active.     Ms. Meyer should be reassured that while her nonverbal memory is very weak, her verbal memory skills are only mildly reduced. She will benefit from the following strategies.     In her daily life, Ms. Meyer will continue to benefit from the use of compensation techniques. That is, she may find it helpful to post reminder notes around the house, make lists, and carry a small calendar so that she can feel more comfortable and confident in her ability to remember information. A daily planner could also be used as a memory book where important information is recorded and organized for future reference.     Ms. Meyer should also continue to use a system to establish set locations for certain items (i.e., keys) such that she always knows where to put them upon entering the house and where to look when she needs them. If she would like to keep certain items out of sight (i.e., a wallet), she could set up a specific hidden place to keep items and use that same place so as to ensure she can find the required item when  needed.     When required to learn new information, Ms. Meyer showed good benefit from repetition. Thus it is recommended that information be broken down into small units and repeated to facilitate her learning.    Ms. Meyer will do best in an environment where a lot of routines are established so that she can follow a regular pattern for her daily or weekly routines.   Follow-up    Given evidence of cognitive impairment on current testing and potential for further decline, re-evaluation in 12-18 months is recommended. The current test data can be used as a baseline to which future comparisons can be made.

## 2022-01-13 ENCOUNTER — TELEPHONE (OUTPATIENT)
Dept: INTERNAL MEDICINE | Facility: CLINIC | Age: 77
End: 2022-01-13

## 2022-01-13 NOTE — TELEPHONE ENCOUNTER
Reason for Call:  Other call back    Detailed comments: pt had some testings done and would like to discuss further with PCP and also discuss medication    Please advise    Phone Number Patient can be reached at: Home number on file [phone] (home)    Best Time: anytime    Can we leave a detailed message on this number? YES    Call taken on 1/13/2022 at 2:54 PM by Veronica Gaspar

## 2022-01-21 ENCOUNTER — TELEPHONE (OUTPATIENT)
Dept: INTERNAL MEDICINE | Facility: CLINIC | Age: 77
End: 2022-01-21

## 2022-01-21 ENCOUNTER — VIRTUAL VISIT (OUTPATIENT)
Dept: INTERNAL MEDICINE | Facility: CLINIC | Age: 77
End: 2022-01-21
Payer: COMMERCIAL

## 2022-01-21 DIAGNOSIS — G31.84 MILD COGNITIVE IMPAIRMENT: Primary | ICD-10-CM

## 2022-01-21 DIAGNOSIS — F41.1 GENERALIZED ANXIETY DISORDER: ICD-10-CM

## 2022-01-21 PROCEDURE — 99214 OFFICE O/P EST MOD 30 MIN: CPT | Mod: 95 | Performed by: INTERNAL MEDICINE

## 2022-01-21 RX ORDER — DONEPEZIL HYDROCHLORIDE 5 MG/1
5 TABLET, FILM COATED ORAL AT BEDTIME
Qty: 30 TABLET | Refills: 3 | Status: SHIPPED | OUTPATIENT
Start: 2022-01-21 | End: 2022-02-18

## 2022-01-21 NOTE — PROGRESS NOTES
"Monika is a 76 year old female being evaluated via a billable phone visit, and would like to be contacted via the following  Home number on file [phone] (home)         ASSESSMENT and PLAN: Visit to review few neuropsych testing  1. Mild cognitive impairment  Neuropsych testing was reviewed with patient.  It was very thorough and patient is somewhat upset about the findings.  She has mild cognitive impairment likely multifactorial.  Previous brain surgery for benign but large meningioma is a contributing factor.  Also, she has untreated generalized anxiety and strong family history of Alzheimer's disease in her mother-onset mid 70s.  Her most recent MRI shows microvascular changes and atrophy as well as postsurgical changes from her meningioma resection.    Priorities discussed today.  Patient would like to proceed with a \"brain \"medicine first.  She is open to contemplating an anxiety medication and would consider starting the citalopram.  However we are not going to start more than 1 medication at a time.  Encourage regular daily exercise and fresh air as able.  Encourage a daily session of word games such as word searches/crossword puzzles/etc.  Agree with examiner and that she should turn over functions such as managing finances and checkbook to her .  Of note, this is hard as they have always done this independently.  Encouraged structured routine.  In the future, I would like to treat her generalized anxiety as I do believe it gets in the way of processing day-to-day information    Finally, she wants me to be aware and we will get her paperwork to corroborate that I could talk about her case with her daughter Simin Carreon or her  Natalio.    A prescription for donepezil as ordered.  Follow-up in 4 to 6 weeks.  - donepezil (ARICEPT) 5 MG tablet; Take 1 tablet (5 mg) by mouth At Bedtime  Dispense: 30 tablet; Refill: 3    2. Generalized anxiety disorder  As above-would like to start citalopram at " "low-dose.  However, we will hold for now and readdress this at next appointment.         Preventive Care Assessed:          Medication list carefully reviewed and corrected  Epic Merger Problem list addressed and updated    CHIEF COMPLAINT:  Chief Complaint   Patient presents with     Follow Up     Results        HISTORY OF PRESENT ILLNESS:  Monika is a 76 year old female contacting the clinic today via phone for discussion of neuropsych test results.  Of note, at her wellness visit in August, she confided that she was very worried about her cognition.  She states that she gets stressed when driving a car or doing tasks such as managing her checkbook.  She states both she and her  do their finances independently.  It is a second marriage for each of them.  She states that this is how it does always been for them.  She has some difficulty when merging these tasks.  This evaluation or discussion resulted in a referral for neuropsych testing.  The neuropsych testing was done in December and the results are reviewed with the patient today.    She is feeling very upset about the results.  She worries that she will have dementia like her mother who began with cognitive impairment in her mid 70s.  She does report that her mother's progression however was very slow.    REVIEW OF SYSTEMS:        PFS:  Social History     Social History Narrative     Not on file         TOBACCO USE:  History   Smoking Status     Former Smoker     Quit date: 3/27/1987   Smokeless Tobacco     Never Used       VITALS:  There were no vitals filed for this visit.  There were no vitals taken for this visit. Estimated body mass index is 19.83 kg/m  as calculated from the following:    Height as of 8/25/21: 1.581 m (5' 2.25\").    Weight as of 8/25/21: 49.6 kg (109 lb 4.8 oz).    PHYSICAL EXAM:  (observations via Phone)  She does not sound ill.  She is very fluid and fluent.  Her thought processes logical.    MEDICATIONS  Current Outpatient " Medications   Medication Sig Dispense Refill     ascorbic acid (ASCORBIC ACID WITH SOHAN HIPS) 500 MG tablet [ASCORBIC ACID (ASCORBIC ACID WITH SOHAN HIPS) 500 MG TABLET] Take 500 mg by mouth daily.       aspirin 81 MG EC tablet [ASPIRIN 81 MG EC TABLET] Take 81 mg by mouth daily.       atorvastatin (LIPITOR) 10 MG tablet TAKE ONE TABLET BY MOUTH ONE TIME DAILY  90 tablet 3     CALCIUM CITRATE/VITAMIN D3 (CITRACAL + D ORAL) [CALCIUM CITRATE/VITAMIN D3 (CITRACAL + D ORAL)] Take 2 capsules by mouth daily.        cholecalciferol, vitamin D3, 1,000 unit capsule [CHOLECALCIFEROL, VITAMIN D3, 1,000 UNIT CAPSULE] Take 1,000 Units by mouth daily.       magnesium 250 mg Tab [MAGNESIUM 250 MG TAB] Take 1 tablet by mouth daily.       Multiple Vitamins-Minerals (MULTI COMPLETE PO) Take 1 tablet by mouth daily       atorvastatin (LIPITOR) 10 MG tablet [ATORVASTATIN (LIPITOR) 10 MG TABLET] TAKE ONE TABLET BY MOUTH ONE TIME DAILY  90 tablet 0     atorvastatin (LIPITOR) 10 MG tablet [ATORVASTATIN (LIPITOR) 10 MG TABLET] TAKE ONE TABLET BY MOUTH ONE TIME DAILY  90 tablet 3     citalopram (CELEXA) 10 MG tablet [CITALOPRAM (CELEXA) 10 MG TABLET] Half tablet daily for the first 2 weeks.  If tolerating well, increase to 1 tablet daily. (Patient not taking: Reported on 1/21/2022) 30 tablet 2       Notes summarized: Summarize notes from the neuropsychologist labs, x-rays, cardiology, GI tests reviewed:   Recent Labs   Lab Test 08/25/21  1312 07/12/21  1002 08/19/20  0756   HGB  --  13.7 13.9     --  142   POTASSIUM 4.7  --  5.0   CR 0.90  --  0.86   A1C 5.7*  --   --    TSH 1.21  --   --    VITDT 42 37  --      No results found for: [id number]  No components found for: VITD  Lab Results   Component Value Date    CHOL 179 08/25/2021     New orders: No orders of the defined types were placed in this encounter.      Independent review of: Neuropsych tests  Supplemental history by:      Patient would like to receive their AVS by  [facility]    Phone Start Time: 1:10 PM  Phone End time:  1:35 PM  Conversation plus orders: 27 minutes  Dictation time:  3 minutes    The visit lasted a total of 30 minutes

## 2022-01-21 NOTE — TELEPHONE ENCOUNTER
Consent to Communicate mailed and patient scheduled for follow up on 2/18.       ----- Message from Lizabeth Turcios MD sent at 1/21/2022  1:39 PM CST -----  Monika would like to misty permission for me to speak to her daughter Simin Carreon and her  Natalio.  (Oniel Ordoñez)    please forward the appropriate paperwork        Also, she will need a follow-up visit with me in 4 to 6 weeks.  It can be virtual or in person

## 2022-01-27 ENCOUNTER — TRANSFERRED RECORDS (OUTPATIENT)
Dept: HEALTH INFORMATION MANAGEMENT | Facility: CLINIC | Age: 77
End: 2022-01-27
Payer: COMMERCIAL

## 2022-02-18 ENCOUNTER — VIRTUAL VISIT (OUTPATIENT)
Dept: INTERNAL MEDICINE | Facility: CLINIC | Age: 77
End: 2022-02-18
Payer: COMMERCIAL

## 2022-02-18 DIAGNOSIS — G31.84 MILD COGNITIVE IMPAIRMENT: ICD-10-CM

## 2022-02-18 PROCEDURE — 99213 OFFICE O/P EST LOW 20 MIN: CPT | Mod: 95 | Performed by: INTERNAL MEDICINE

## 2022-02-18 RX ORDER — DONEPEZIL HYDROCHLORIDE 5 MG/1
5 TABLET, FILM COATED ORAL AT BEDTIME
Qty: 90 TABLET | Refills: 3 | Status: SHIPPED | OUTPATIENT
Start: 2022-02-18 | End: 2023-02-06

## 2022-02-18 NOTE — PROGRESS NOTES
Monika is a 76 year old female being evaluated via a billable phone visit, and would like to be contacted via the following  Home number on file [phone] (home)         ASSESSMENT and PLAN:  1. Mild cognitive impairment  New diagnosis of mild cognitive impairment-corroborated by neuro psych testing.  Of note, she is status post meningioma resection and this is felt to be a contributing factor.  She has not had PET scans to look for amyloid beta plaque.  Taking Aricept but having leg cramps in the early a.m.  She is inquiring about the new medication Aduhelm.    Recommendation: Because this medication has recently been FDA approved with some controversy, I am going to do additional research.  She is going to inquire about any research protocols in the [facility] such as at [facility].  She is going to look at the [employer] website as well.  Apparently, the utility is questionable-although the mechanism makes sense.  Common side effects were headache, falls, diarrhea, confusion and delirium.    In the meantime, we will try push on with Aricept taking it in the a.m. instead of p.m. with food and pushing fluids throughout the day.  If the restless legs at night persist, she will discontinue the medication.  I will touch base with her next week.    - donepezil (ARICEPT) 5 MG tablet; Take 1 tablet (5 mg) by mouth At Bedtime  Dispense: 90 tablet; Refill: 3       Preventive Care Assessed:          Medication list carefully reviewed and corrected  Epic Merger Problem list addressed and updated    CHIEF COMPLAINT:  Chief Complaint   Patient presents with     Follow Up       HISTORY OF PRESENT ILLNESS:  Monika is a 76 year old female contacting the clinic today via phone for discussion of medications for mild cognitive impairment.  Of note, we had a visit last month for discussion of medications related to mild cognitive impairment.  Anxiety was considered a significant contributing factor to her  "situation.  However, she states her anxiety is really related to her memory.  Behavioral measures were discussed at that time.  She is decreasing the stress in her life, playing word games/etc.  She did start the Aricept.  She did not have any gastrointestinal side effects with this.  However, she has had some issues with restless legs.  She states the medication causes her to wake up in the middle of the night with significant discomfort.  This has been going on since the start.    Additionally, she inquires about the new immune modulating medication that has recently been FDA approved for Alzheimer's disease.  Aducanumab apparently was put through the a= FDA with some controversy.  I will inquire about its use in the [facility].  Of note, she has had multiple MRIs but has not had any PET scanning.    She feels her anxiety is under control    REVIEW OF SYSTEMS:        PFSH:  Social History     Social History Narrative     Not on file         TOBACCO USE:  History   Smoking Status     Former Smoker     Quit date: 3/27/1987   Smokeless Tobacco     Never Used       VITALS:  There were no vitals filed for this visit.  There were no vitals taken for this visit. Estimated body mass index is 19.83 kg/m  as calculated from the following:    Height as of 8/25/21: 1.581 m (5' 2.25\").    Weight as of 8/25/21: 49.6 kg (109 lb 4.8 oz).    PHYSICAL EXAM:  (observations via Phone)  Her voice sounds clear and bright.  Very lucid conversation    MEDICATIONS  Current Outpatient Medications   Medication Sig Dispense Refill     ascorbic acid (ASCORBIC ACID WITH SOHAN HIPS) 500 MG tablet [ASCORBIC ACID (ASCORBIC ACID WITH SOHAN HIPS) 500 MG TABLET] Take 500 mg by mouth daily.       aspirin 81 MG EC tablet [ASPIRIN 81 MG EC TABLET] Take 81 mg by mouth daily.       atorvastatin (LIPITOR) 10 MG tablet TAKE ONE TABLET BY MOUTH ONE TIME DAILY  90 tablet 3     CALCIUM CITRATE/VITAMIN D3 (CITRACAL + D ORAL) [CALCIUM CITRATE/VITAMIN D3 " (CITRACAL + D ORAL)] Take 2 capsules by mouth daily.        cholecalciferol, vitamin D3, 1,000 unit capsule [CHOLECALCIFEROL, VITAMIN D3, 1,000 UNIT CAPSULE] Take 1,000 Units by mouth daily.       donepezil (ARICEPT) 5 MG tablet Take 1 tablet (5 mg) by mouth At Bedtime 30 tablet 3     magnesium 250 mg Tab [MAGNESIUM 250 MG TAB] Take 1 tablet by mouth daily.       Multiple Vitamins-Minerals (MULTI COMPLETE PO) Take 1 tablet by mouth daily         Notes summarized:   Labs, x-rays, cardiology, GI tests reviewed:   Recent Labs   Lab Test 08/25/21  1312 07/12/21  1002 08/19/20  0756   HGB  --  13.7 13.9     --  142   POTASSIUM 4.7  --  5.0   CR 0.90  --  0.86   A1C 5.7*  --   --    TSH 1.21  --   --    VITDT 42 37  --      No results found for: [id number]  No components found for: VITD  Lab Results   Component Value Date    CHOL 179 08/25/2021     New orders: No orders of the defined types were placed in this encounter.      Independent review of:  Supplemental history by:      Patient would like to receive their AVS by [facility]    Phone Start Time: 3:07 PM  Phone End time:  3:15 PM  Conversation plus orders:  8 minutes  Dictation time:  3 minutes    The visit lasted a total of  11 minutes

## 2022-04-18 ENCOUNTER — TRANSFERRED RECORDS (OUTPATIENT)
Dept: HEALTH INFORMATION MANAGEMENT | Facility: CLINIC | Age: 77
End: 2022-04-18
Payer: COMMERCIAL

## 2022-06-28 ENCOUNTER — TELEPHONE (OUTPATIENT)
Dept: INTERNAL MEDICINE | Facility: CLINIC | Age: 77
End: 2022-06-28

## 2022-06-28 NOTE — TELEPHONE ENCOUNTER
Reason for Call:  Other call back    Detailed comments: Pt stating she tested positive for Covid from the home testing - she wishes to speak with PCP    Please advise    Phone Number Patient can be reached at: Home number on file [phone] (home)    Best Time: anytime    Can we leave a detailed message on this number? YES    Call taken on 6/28/2022 at 8:24 AM by Veronica Gaspar

## 2022-06-30 ENCOUNTER — VIRTUAL VISIT (OUTPATIENT)
Dept: INTERNAL MEDICINE | Facility: CLINIC | Age: 77
End: 2022-06-30
Payer: COMMERCIAL

## 2022-06-30 DIAGNOSIS — C92.10 CHRONIC MYELOID LEUKEMIA (H): ICD-10-CM

## 2022-06-30 DIAGNOSIS — U07.1 COVID-19 VIRUS INFECTION: Primary | ICD-10-CM

## 2022-06-30 PROBLEM — K63.5 POLYP OF COLON: Status: ACTIVE | Noted: 2021-10-20

## 2022-06-30 PROBLEM — D32.0 BENIGN MENINGIOMA OF BRAIN (H): Status: ACTIVE | Noted: 2022-06-30

## 2022-06-30 PROBLEM — L60.0 ONYCHOCRYPTOSIS: Status: ACTIVE | Noted: 2017-10-26

## 2022-06-30 PROCEDURE — 99213 OFFICE O/P EST LOW 20 MIN: CPT | Mod: 95 | Performed by: INTERNAL MEDICINE

## 2022-06-30 NOTE — PATIENT INSTRUCTIONS
Discussed options for symptomatic treatment including cough medication, Mucinex, steroids but she defers    Given minimal symptoms now day 3 advise against Paxlovid    Contact us if symptoms worsen

## 2022-06-30 NOTE — TELEPHONE ENCOUNTER
Please contact patient and let her know to arrange Covid treatment visit via Gura Geart. MD is out of the clinic until 7/1/22

## 2022-06-30 NOTE — PROGRESS NOTES
Monika is a 76 year old who is being evaluated via a billable telephone visit.      What phone number would you like to be contacted at? [phone]  How would you like to obtain your AVS? Hanghart    {PROVIDER CHARTING PREFERENCE:050839}    Subjective   Monika is a 76 year old{ACCOMPANIED BY STATEMENT (Optional):062995}, presenting for the following health issues:  Covid Concern (Tested positive Tuesday )      HPI   Answers for HPI/ROS submitted by the patient on 6/30/2022  Headache Symptoms are: no change  How often are you getting headaches or migraines? : Do not have headache today  Are you able to do normal daily activities when you have a migraine?: Yes  Migraine Rescue/Relief Medications:: no rescue/relief medications, other  Effectiveness of rescue/relief medications:: I get total relief  Migraine Preventative Medications:: no medications to prevent migraines  ER or UC Visits:: 0 times  On average, how many sweetened beverages do you drink each day (Examples: soda, juice, sweet tea, etc.  Do NOT count diet or artificially sweetened beverages)?: 0  How many minutes a day do you exercise enough to make your heart beat faster?: 9 or less  How many days a week do you exercise enough to make your heart beat faster?: 3 or less  How many days per week do you miss taking your medication?: 0  What is the reason for your visit today?: Tested positive for covid  When did your symptoms begin?: 1-3 days ago  How would you describe these symptoms?: Mild  Are your symptoms:: Staying the same  Have you had these symptoms before?: No  Is there anything that makes you feel worse?: No  Is there anything that makes you feel better?: [place] for throat      {SUPERLIST (Optional):842555}  {additonal problems for provider to add (Optional):752950}    Review of Systems   {ROS COMP (Optional):496992}      Objective           Vitals:  No vitals were obtained today due to virtual visit.    Physical Exam   {GENERAL  "APPEARANCE:50::\"healthy\",\"alert\",\"no distress\"}  PSYCH: Alert and oriented times 3; coherent speech, normal   rate and volume, able to articulate logical thoughts, able   to abstract reason, no tangential thoughts, no hallucinations   or delusions  Her affect is { :6303033::\"normal\"}  RESP: No cough, no audible wheezing, able to talk in full sentences  Remainder of exam unable to be completed due to telephone visits    {Diagnostic Test Results (Optional):486388}    {AMBULATORY ATTESTATION (Optional):573612}        Phone call duration: *** minutes    .  ..    "

## 2022-06-30 NOTE — PROGRESS NOTES
Monika is a 76 year old female being evaluated via a billable phone visit, and would like to be contacted via the following  Home number on file [phone] (home)    ASSESSMENT and PLAN:  1. COVID-19 virus infection  Symptoms minimal with onset June 27.  No need even for symptomatic medication so benefit of Paxlovid probably does not outweigh risk despite CML.    2. Chronic myeloid leukemia (H)  Minimally symptomatic.  No history of increased infections.  White blood cell count last year normal    3.  Dementia.  On Aricept but history seems cogent     Patient Instructions   Discussed options for symptomatic treatment including cough medication, Mucinex, steroids but she defers    Given minimal symptoms now day 3 advise against Paxlovid    Contact us if symptoms worsen            Return in about 4 months (around 10/30/2022) for using a video visit.       CHIEF COMPLAINT:  Chief Complaint   Patient presents with     Covid Concern     Tested positive Tuesday        HISTORY OF PRESENT ILLNESS:  Monika is a 76 year old female contacting the clinic today via phone for positive COVID.  She developed symptoms June 27 and test + June 28.  She complains of rhinorrhea, mild cough, and fatigue.  No fever, chills, headache, shortness of breath or wheezing.  She is not making excessive mucus.  She has no history of asthma or emphysema.  Cough is not interfering with sleep.  She has been vaccinated.  We discussed symptomatic medication which she defers.  We discussed risks and benefits of Paxlovid.  She has CML is under no active treatment.  White blood cell count last year was normal and she does not report excessive infections.  In general she is averse to excess medication if possible    REVIEW OF SYSTEMS:  No fever    PFSH:  Social History     Social History Narrative     Not on file      and lives with her  who tested negative today    TOBACCO USE:  History   Smoking Status     Former Smoker     Quit date:  "3/27/1987   Smokeless Tobacco     Never Used       VITALS:  There were no vitals filed for this visit.  There were no vitals taken for this visit. Estimated body mass index is 19.83 kg/m  as calculated from the following:    Height as of 8/25/21: 1.581 m (5' 2.25\").    Weight as of 8/25/21: 49.6 kg (109 lb 4.8 oz).    PHYSICAL EXAM:  (observations via Phone)  Alert and oriented.  No obvious impairment.  No coughing or shortness of breath    MEDICATIONS  Current Outpatient Medications   Medication Sig Dispense Refill     ascorbic acid (ASCORBIC ACID WITH SOHAN HIPS) 500 MG tablet [ASCORBIC ACID (ASCORBIC ACID WITH SOHAN HIPS) 500 MG TABLET] Take 500 mg by mouth daily.       aspirin 81 MG EC tablet [ASPIRIN 81 MG EC TABLET] Take 81 mg by mouth daily.       atorvastatin (LIPITOR) 10 MG tablet TAKE ONE TABLET BY MOUTH ONE TIME DAILY  90 tablet 3     CALCIUM CITRATE/VITAMIN D3 (CITRACAL + D ORAL) [CALCIUM CITRATE/VITAMIN D3 (CITRACAL + D ORAL)] Take 2 capsules by mouth daily.        cholecalciferol, vitamin D3, 1,000 unit capsule [CHOLECALCIFEROL, VITAMIN D3, 1,000 UNIT CAPSULE] Take 1,000 Units by mouth daily.       donepezil (ARICEPT) 5 MG tablet Take 1 tablet (5 mg) by mouth At Bedtime 90 tablet 3     magnesium 250 mg Tab [MAGNESIUM 250 MG TAB] Take 1 tablet by mouth daily.       Multiple Vitamins-Minerals (MULTI COMPLETE PO) Take 1 tablet by mouth daily         Notes summarized:   Labs, x-rays, cardiology, GI tests reviewed:   Recent Labs   Lab Test 08/25/21  1312 07/12/21  1002 08/19/20  0756   HGB  --  13.7 13.9     --  142   POTASSIUM 4.7  --  5.0   CR 0.90  --  0.86   A1C 5.7*  --   --    TSH 1.21  --   --    VITDT 42 37  --      No results found for: [id number]  Lab Results   Component Value Date    CHOL 179 08/25/2021     New orders: No orders of the defined types were placed in this encounter.      Independent review of:  Supplemental history by:      Patient would like to receive their AVS by " "Timothy    History of Present Illness       Headaches:   Since the patient's last clinic visit, headaches are: no change  The patient is getting headaches:  Do not have headache today  She is able to do normal daily activities when she has a migraine.  The patient is taking the following rescue/relief medications:  No rescue/relief medications and other   Patient states \"I get total relief\" from the rescue/relief medications.   The patient is taking the following medications to prevent migraines:  No medications to prevent migraines  In the past 4 weeks, the patient has gone to an Urgent Care or Emergency Room 0 times times due to headaches.    Reason for visit:  Tested positive for covid  Symptom onset:  1-3 days ago  Symptom intensity:  Mild  Symptom progression:  Staying the same  Had these symptoms before:  No  What makes it worse:  No  What makes it better:  [place] for throatShcharmaine consumes 0 sweetened beverage(s) daily.She exercises with enough effort to increase her heart rate 9 or less minutes per day.  She exercises with enough effort to increase her heart rate 3 or less days per week.   She is taking medications regularly.      Reagan Billings MD  [facility]    Phone Start Time: 11:33 AM  Phone End time:  11:47 AM  Conversation plus orders: 14 minutes  Dictation time:  3 minutes    The visit lasted a total of 15 minutes   "

## 2022-07-08 ENCOUNTER — TELEPHONE (OUTPATIENT)
Dept: INTERNAL MEDICINE | Facility: CLINIC | Age: 77
End: 2022-07-08

## 2022-07-08 NOTE — TELEPHONE ENCOUNTER
Reason for Call:  Other call back    Detailed comments:     Patient was seen on 6/30/2022 by Dr. Billings regarding positive Covid. Patient stated she was told Covid should only lasted 3-5 days. Patient says she is now on day 10 and still having symptoms. Patient is needing advice if she should do something differently. Please advised.     Phone Number Patient can be reached at: Home number on file [phone] (home)    Best Time: ANY    Can we leave a detailed message on this number? YES    Call taken on 7/8/2022 at 11:15 AM by Mandeep Patel

## 2022-07-11 NOTE — TELEPHONE ENCOUNTER
Called Monika and she said that they have not had symptoms for a while. Pt advised on the CDC isolation recommendations for positive symptoms. Pt was appreciative of the information and does not require further information at this time.

## 2022-08-10 ENCOUNTER — TELEPHONE (OUTPATIENT)
Dept: LAB | Facility: CLINIC | Age: 77
End: 2022-08-10

## 2022-08-10 DIAGNOSIS — C92.10 CHRONIC MYELOID LEUKEMIA (H): ICD-10-CM

## 2022-08-10 DIAGNOSIS — E55.9 VITAMIN D DEFICIENCY, UNSPECIFIED: ICD-10-CM

## 2022-08-10 DIAGNOSIS — Z13.21 ENCOUNTER FOR VITAMIN DEFICIENCY SCREENING: ICD-10-CM

## 2022-08-10 DIAGNOSIS — Z00.00 ENCOUNTER FOR PREVENTATIVE ADULT HEALTH CARE EXAMINATION: Primary | ICD-10-CM

## 2022-08-10 NOTE — PROGRESS NOTES
Dr. Turcios, your patient Monika has made a lab only visit on 8/23 to had her annual labs done. Please place orders in the chart. Thank you.

## 2022-08-23 ENCOUNTER — LAB (OUTPATIENT)
Dept: LAB | Facility: CLINIC | Age: 77
End: 2022-08-23
Payer: COMMERCIAL

## 2022-08-23 DIAGNOSIS — C92.10 CHRONIC MYELOID LEUKEMIA (H): ICD-10-CM

## 2022-08-23 DIAGNOSIS — Z00.00 ENCOUNTER FOR PREVENTATIVE ADULT HEALTH CARE EXAMINATION: ICD-10-CM

## 2022-08-23 DIAGNOSIS — Z13.21 ENCOUNTER FOR VITAMIN DEFICIENCY SCREENING: ICD-10-CM

## 2022-08-23 DIAGNOSIS — E55.9 VITAMIN D DEFICIENCY, UNSPECIFIED: ICD-10-CM

## 2022-08-23 LAB
ALBUMIN SERPL BCG-MCNC: 4.4 G/DL (ref 3.5–5.2)
ALP SERPL-CCNC: 84 U/L (ref 35–104)
ALT SERPL W P-5'-P-CCNC: 19 U/L (ref 10–35)
ANION GAP SERPL CALCULATED.3IONS-SCNC: 10 MMOL/L (ref 7–15)
AST SERPL W P-5'-P-CCNC: 25 U/L (ref 10–35)
BASOPHILS # BLD AUTO: 0.1 10E3/UL (ref 0–0.2)
BASOPHILS NFR BLD AUTO: 1 %
BILIRUB SERPL-MCNC: 0.5 MG/DL
BUN SERPL-MCNC: 14 MG/DL (ref 8–23)
CALCIUM SERPL-MCNC: 9.6 MG/DL (ref 8.8–10.2)
CHLORIDE SERPL-SCNC: 104 MMOL/L (ref 98–107)
CHOLEST SERPL-MCNC: 189 MG/DL
CREAT SERPL-MCNC: 0.93 MG/DL (ref 0.51–0.95)
DEPRECATED HCO3 PLAS-SCNC: 27 MMOL/L (ref 22–29)
EOSINOPHIL # BLD AUTO: 0.5 10E3/UL (ref 0–0.7)
EOSINOPHIL NFR BLD AUTO: 6 %
ERYTHROCYTE [DISTWIDTH] IN BLOOD BY AUTOMATED COUNT: 13.2 % (ref 10–15)
GFR SERPL CREATININE-BSD FRML MDRD: 63 ML/MIN/1.73M2
GLUCOSE SERPL-MCNC: 102 MG/DL (ref 70–99)
HBA1C MFR BLD: 5.9 % (ref 0–5.6)
HCT VFR BLD AUTO: 42.6 % (ref 35–47)
HDLC SERPL-MCNC: 88 MG/DL
HGB BLD-MCNC: 13.9 G/DL (ref 11.7–15.7)
IMM GRANULOCYTES # BLD: 0 10E3/UL
IMM GRANULOCYTES NFR BLD: 0 %
LDLC SERPL CALC-MCNC: 87 MG/DL
LYMPHOCYTES # BLD AUTO: 1.4 10E3/UL (ref 0.8–5.3)
LYMPHOCYTES NFR BLD AUTO: 19 %
MCH RBC QN AUTO: 30.3 PG (ref 26.5–33)
MCHC RBC AUTO-ENTMCNC: 32.6 G/DL (ref 31.5–36.5)
MCV RBC AUTO: 93 FL (ref 78–100)
MONOCYTES # BLD AUTO: 0.7 10E3/UL (ref 0–1.3)
MONOCYTES NFR BLD AUTO: 9 %
NEUTROPHILS # BLD AUTO: 4.9 10E3/UL (ref 1.6–8.3)
NEUTROPHILS NFR BLD AUTO: 65 %
NONHDLC SERPL-MCNC: 101 MG/DL
PLATELET # BLD AUTO: 342 10E3/UL (ref 150–450)
POTASSIUM SERPL-SCNC: 5 MMOL/L (ref 3.4–5.3)
PROT SERPL-MCNC: 6.9 G/DL (ref 6.4–8.3)
RBC # BLD AUTO: 4.59 10E6/UL (ref 3.8–5.2)
SODIUM SERPL-SCNC: 141 MMOL/L (ref 136–145)
TRIGL SERPL-MCNC: 68 MG/DL
WBC # BLD AUTO: 7.6 10E3/UL (ref 4–11)

## 2022-08-23 PROCEDURE — 80061 LIPID PANEL: CPT

## 2022-08-23 PROCEDURE — 82306 VITAMIN D 25 HYDROXY: CPT

## 2022-08-23 PROCEDURE — 36415 COLL VENOUS BLD VENIPUNCTURE: CPT

## 2022-08-23 PROCEDURE — 80053 COMPREHEN METABOLIC PANEL: CPT

## 2022-08-23 PROCEDURE — 83036 HEMOGLOBIN GLYCOSYLATED A1C: CPT

## 2022-08-23 PROCEDURE — 85025 COMPLETE CBC W/AUTO DIFF WBC: CPT

## 2022-08-24 LAB — DEPRECATED CALCIDIOL+CALCIFEROL SERPL-MC: 48 UG/L (ref 20–75)

## 2022-08-30 ENCOUNTER — OFFICE VISIT (OUTPATIENT)
Dept: INTERNAL MEDICINE | Facility: CLINIC | Age: 77
End: 2022-08-30
Payer: COMMERCIAL

## 2022-08-30 VITALS
HEART RATE: 65 BPM | OXYGEN SATURATION: 99 % | SYSTOLIC BLOOD PRESSURE: 130 MMHG | WEIGHT: 112.9 LBS | BODY MASS INDEX: 20.48 KG/M2 | DIASTOLIC BLOOD PRESSURE: 68 MMHG

## 2022-08-30 DIAGNOSIS — D32.9 MENINGIOMA (H): ICD-10-CM

## 2022-08-30 DIAGNOSIS — Z12.31 ENCOUNTER FOR SCREENING MAMMOGRAM FOR MALIGNANT NEOPLASM OF BREAST: ICD-10-CM

## 2022-08-30 DIAGNOSIS — I65.23 CAROTID ARTERY PLAQUE, BILATERAL: ICD-10-CM

## 2022-08-30 DIAGNOSIS — E78.00 HYPERCHOLESTEREMIA: ICD-10-CM

## 2022-08-30 DIAGNOSIS — Z00.00 ENCOUNTER FOR ANNUAL WELLNESS EXAM IN MEDICARE PATIENT: Primary | ICD-10-CM

## 2022-08-30 DIAGNOSIS — E55.9 VITAMIN D DEFICIENCY: ICD-10-CM

## 2022-08-30 DIAGNOSIS — C92.10 CHRONIC MYELOID LEUKEMIA (H): ICD-10-CM

## 2022-08-30 DIAGNOSIS — Z00.00 ENCOUNTER FOR MEDICARE ANNUAL WELLNESS EXAM: ICD-10-CM

## 2022-08-30 DIAGNOSIS — G31.84 MILD COGNITIVE IMPAIRMENT: ICD-10-CM

## 2022-08-30 DIAGNOSIS — Z85.3 PERSONAL HISTORY OF MALIGNANT NEOPLASM OF BREAST: ICD-10-CM

## 2022-08-30 PROCEDURE — 99214 OFFICE O/P EST MOD 30 MIN: CPT | Mod: 25 | Performed by: INTERNAL MEDICINE

## 2022-08-30 PROCEDURE — G0439 PPPS, SUBSEQ VISIT: HCPCS | Performed by: INTERNAL MEDICINE

## 2022-08-30 RX ORDER — ATORVASTATIN CALCIUM 10 MG/1
10 TABLET, FILM COATED ORAL DAILY
Qty: 90 TABLET | Refills: 3 | Status: SHIPPED | OUTPATIENT
Start: 2022-08-30 | End: 2023-02-23

## 2022-08-30 ASSESSMENT — ACTIVITIES OF DAILY LIVING (ADL): CURRENT_FUNCTION: NO ASSISTANCE NEEDED

## 2022-08-30 NOTE — PATIENT INSTRUCTIONS
Patient Education   Personalized Prevention Plan  You are due for the preventive services outlined below.  Your care team is available to assist you in scheduling these services.  If you have already completed any of these items, please share that information with your care team to update in your medical record.  Health Maintenance Due   Topic Date Due     Flu Vaccine (1) 09/01/2022

## 2022-08-30 NOTE — PROGRESS NOTES
"SUBJECTIVE:   Monika Meyer is a 77 year old female who presents for Preventive Visit.      Patient has been advised of split billing requirements and indicates understanding: Yes  Are you in the first 12 months of your Medicare coverage?  Sarina    Is a delightful 77-year-old female with a history of CML and cognitive impairment.  She is here today for her annual wellness visit.  Of note, overall she is doing fairly well.  According to both she and her , her cognitive course has remained fairly steady.  She does have memory loss that she is aware of.  However, she is able to continue living her life, perform ADLs, fix meals/etc.  Her  states that she continues to manage her independent checking account.  He helps her as needed.    Her medical history is significant for CML and she is followed quarterly by hematology.  She has had a meningioma-resected.  She has an annual MRI for this as well.  This undoubtedly had contributed to her cognitive decline i.e. resection of the meningioma.  Additionally, she has carotid plaque.    Her weight has been healthy.  She has been exercising by \"going to the club \".  Her diet is reviewed.  Labs are reviewed.  She has impaired fasting glucose.  She is instructed to eat more protein and eat less bread.    Healthy Habits:     In general, how would you rate your overall health?  Excellent    Frequency of exercise:  2-3 days/week    Duration of exercise:  45-60 minutes    Do you usually eat at least 4 servings of fruit and vegetables a day, include whole grains    & fiber and avoid regularly eating high fat or \"junk\" foods?  No    Taking medications regularly:  No    Barriers to taking medications:  None and Not applicable    Medication side effects:  None    Ability to successfully perform activities of daily living:  No assistance needed    Home Safety:  No safety concerns identified    Hearing Impairment:  No hearing concerns    In the past 6 months, have you been " bothered by leaking of urine?  No    In general, how would you rate your overall mental or emotional health?  Good      PHQ-2 Total Score: 0    Additional concerns today:  No    Do you feel safe in your environment? Yes    Have you ever done Advance Care Planning? (For example, a Health Directive, POLST, or a discussion with a medical provider or your loved ones about your wishes): Yes, patient states has an Advance Care Planning document and will bring a copy to the clinic.       Fall risk  Fallen 2 or more times in the past year?: No  Any fall with injury in the past year?: No    Cognitive Screening   1) Repeat 3 items (Leader, Season, Table)    2) Clock draw: NORMAL  3) 3 item recall: Recalls 3 objects  Results: 3 items recalled: COGNITIVE IMPAIRMENT LESS LIKELY    Mini-CogTM Copyright S Viv. Licensed by the author for use in [facility]; reprinted with permission (sebastian@[facility]). All rights reserved.      Do you have sleep apnea, excessive snoring or daytime drowsiness?: no    Reviewed and updated as needed this visit by clinical staff   Tobacco  Allergies  Meds                Reviewed and updated as needed this visit by Provider                   Social History     Tobacco Use     Smoking status: Former Smoker     Quit date: 3/27/1987     Years since quittin.4     Smokeless tobacco: Never Used   Substance Use Topics     Alcohol use: Yes     If you drink alcohol do you typically have >3 drinks per day or >7 drinks per week? No    Alcohol Use 2022   Prescreen: >3 drinks/day or >7 drinks/week? No   No flowsheet data found.            Current providers sharing in care for this patient include:   Patient Care Team:  Lizabeth Turcios MD as PCP - General (Internal Medicine)  Lizabeth Turcios MD as Assigned PCP  Shelley Jane, PhD LP as Assigned Behavioral Health Provider    The following health maintenance items are reviewed in Epic and correct as of today:  Health Maintenance Due  "  Topic Date Due     MEDICARE ANNUAL WELLNESS VISIT  08/21/2021     ANNUAL REVIEW OF HM ORDERS  08/25/2022     INFLUENZA VACCINE (1) 09/01/2022     Labs reviewed in [facility]  Mammogram Screening: Mammogram Screening - Alternate mammogram schedule due to breast cancer history Annual mammography      Pertinent mammograms are reviewed under the imaging tab.    Review of Systems  Constitutional, HEENT, cardiovascular, pulmonary, GI, , musculoskeletal, neuro, skin, endocrine and psych systems are negative, except as otherwise noted.    OBJECTIVE:   /68 (BP Location: Left arm, Patient Position: Sitting, Cuff Size: Adult Small)   Pulse 65   Wt 51.2 kg (112 lb 14.4 oz)   SpO2 99%   BMI 20.48 kg/m   Estimated body mass index is 20.48 kg/m  as calculated from the following:    Height as of 8/25/21: 1.581 m (5' 2.25\").    Weight as of this encounter: 51.2 kg (112 lb 14.4 oz).  Physical Exam  GENERAL APPEARANCE: healthy, alert and no distress  EYES: Eyes grossly normal to inspection, PERRL and conjunctivae and sclerae normal  HENT: ear canals and TM's normal, nose and mouth without ulcers or lesions, oropharynx clear and oral mucous membranes moist  NECK: no adenopathy, no asymmetry, masses, or scars and thyroid normal to palpation  RESP: lungs clear to auscultation - no rales, rhonchi or wheezes  BREAST: normal without masses, tenderness or nipple discharge and no palpable axillary masses or adenopathy  CV: regular rate and rhythm, normal S1 S2, no S3 or S4, no murmur, click or rub, no peripheral edema and peripheral pulses strong  ABDOMEN: soft, nontender, no hepatosplenomegaly, no masses and bowel sounds normal  MS: no musculoskeletal defects are noted and gait is age appropriate without ataxia  SKIN: no suspicious lesions or rashes  NEURO: Normal strength and tone, sensory exam grossly normal, mentation intact and speech normal  PSYCH: mentation appears normal and affect normal/bright    Diagnostic Test " "Results:    ASSESSMENT / PLAN:   (Z00.00) Encounter for annual wellness exam in Medicare patient  (primary encounter diagnosis)  Comment: She is due for her mammogram.  Colon cancer screening is up-to-date.  Ophthalmologic, dental and Derm care are up-to-date.  Immunizations up-to-date.  Plan:     (G31.84) Mild cognitive impairment  Comment: Have discussed the importance of regular routine-including exercise, brain stimulation/etc.  Plan: Continue with Aricept as planned    (C92.10) Chronic myeloid leukemia (H)  Comment: Followed by hematology with CBCs every 3 months  Plan: Continue the same- blood count stable    (E55.9) Vitamin D deficiency  Comment: Stable  Plan:     (I65.23) Carotid artery plaque, bilateral  Comment: We will update ultrasound of the carotids  Plan: US Carotid Bilateral            (D32.9) Meningioma (H)  Comment: Status post resection.  Has annual MRI per neurosurgery-we will order  Plan: MR Brain w/o Contrast            (Z85.3) Personal history of malignant neoplasm of breast  Comment: Ordered  Plan: MA Screen Bilateral w/Thong            (Z12.31) Encounter for screening mammogram for malignant neoplasm of breast   Comment:   Plan: MA Screen Bilateral w/Thong            (E78.00) Hypercholesteremia  Comment: With carotid plaque, may choose to be more aggressive with lipid-lowering  Plan: atorvastatin (LIPITOR) 10 MG tablet            Impaired fasting glucose  Comment: Have discussed the importance of adding protein in the diet and reduction of sugars  Plan:         COUNSELING:  Reviewed preventive health counseling, as reflected in patient instructions       Regular exercise    Estimated body mass index is 20.48 kg/m  as calculated from the following:    Height as of 8/25/21: 1.581 m (5' 2.25\").    Weight as of this encounter: 51.2 kg (112 lb 14.4 oz).        She reports that she quit smoking about 35 years ago. She has never used smokeless tobacco.      Appropriate preventive services were " discussed with this patient, including applicable screening as appropriate for cardiovascular disease, diabetes, osteopenia/osteoporosis, and glaucoma.  As appropriate for age/gender, discussed screening for colorectal cancer, prostate cancer, breast cancer, and cervical cancer. Checklist reviewing preventive services available has been given to the patient.    Reviewed patients plan of care and provided an AVS. The Basic Care Plan (routine screening as documented in Health Maintenance) for Monika meets the Care Plan requirement. This Care Plan has been established and reviewed with the Patient.    Counseling Resources:  ATP IV Guidelines  Pooled Cohorts Equation Calculator  Breast Cancer Risk Calculator  Breast Cancer: Medication to Reduce Risk  FRAX Risk Assessment  ICSI Preventive Guidelines  Dietary Guidelines for Americans, 2010  USDA's MyPlate  ASA Prophylaxis  Lung CA Screening    Lizabeth Turcios MD  [facility]    Identified Health Risks:

## 2022-09-07 ENCOUNTER — HOSPITAL ENCOUNTER (OUTPATIENT)
Dept: ULTRASOUND IMAGING | Facility: CLINIC | Age: 77
Discharge: HOME | End: 2022-09-07
Attending: INTERNAL MEDICINE | Admitting: INTERNAL MEDICINE
Payer: COMMERCIAL

## 2022-09-07 DIAGNOSIS — I65.23 CAROTID ARTERY PLAQUE, BILATERAL: ICD-10-CM

## 2022-09-07 PROCEDURE — 93880 EXTRACRANIAL BILAT STUDY: CPT

## 2022-09-20 ENCOUNTER — TELEPHONE (OUTPATIENT)
Dept: INTERNAL MEDICINE | Facility: CLINIC | Age: 77
End: 2022-09-20

## 2022-09-29 ENCOUNTER — MYC MEDICAL ADVICE (OUTPATIENT)
Dept: INTERNAL MEDICINE | Facility: CLINIC | Age: 77
End: 2022-09-29

## 2022-09-29 DIAGNOSIS — E78.00 HYPERCHOLESTEREMIA: Primary | ICD-10-CM

## 2022-10-01 ENCOUNTER — HEALTH MAINTENANCE LETTER (OUTPATIENT)
Age: 77
End: 2022-10-01

## 2022-10-04 RX ORDER — ATORVASTATIN CALCIUM 20 MG/1
20 TABLET, FILM COATED ORAL DAILY
Qty: 90 TABLET | Refills: 3 | Status: SHIPPED | OUTPATIENT
Start: 2022-10-04

## 2022-11-21 ENCOUNTER — TELEPHONE (OUTPATIENT)
Dept: INTERNAL MEDICINE | Facility: CLINIC | Age: 77
End: 2022-11-21

## 2022-11-21 NOTE — TELEPHONE ENCOUNTER
General Call      Reason for Call:  Pt asking for recommendation from PCP as to whom she should see    Please advise    What are your questions or concerns:  n/a    Date of last appointment with provider: n/a    Could we send this information to you in [facility] or would you prefer to receive a phone call?:   Patient would prefer a phone call   Okay to leave a detailed message?: Yes at Home number on file [phone] (home)

## 2022-11-29 NOTE — TELEPHONE ENCOUNTER
Spoke with patient. She is requesting Dr. Turcios to recommend a female internal medicine provider for her.     I offered her Dr. Petersen's name at [facility]. Pt expressed no interest in further recommendations from this writer and insisted that her message be sent to Dr. Turcios for a provider recommendation.

## 2023-02-05 DIAGNOSIS — G31.84 MILD COGNITIVE IMPAIRMENT: ICD-10-CM

## 2023-02-05 NOTE — TELEPHONE ENCOUNTER
Medication Question or Refill    Contacts       Type Contact Phone/Fax    02/05/2023 12:49 PM CST Phone (Incoming) Monika Meyer (Self) [phone] (H)          What medication are you calling about (include dose and sig)?: Donepezil 5mg    Preferred Pharmacy:   [id number] - [place], [place] - [address]  [address] [facility] [address]  Phone: [phone] Fax: [phone]      Controlled Substance Agreement on file:   CSA -- Patient Level:    CSA: None found at the patient level.       Who prescribed the medication?: Lizabeth Yarielmary alicea    Do you need a refill? Yes    When did you use the medication last? 02/05/23    Patient offered an appointment? No, has an appointment scheduled with a new doctor at the [facility] on 02/28/23.    Do you have any questions or concerns?  No      Could we send this information to you in [facility] or would you prefer to receive a phone call?:   Patient would prefer a phone call   Okay to leave a detailed message?: Yes at Home number on file [phone] (home)

## 2023-02-07 RX ORDER — DONEPEZIL HYDROCHLORIDE 5 MG/1
5 TABLET, FILM COATED ORAL AT BEDTIME
Qty: 30 TABLET | Refills: 0 | Status: SHIPPED | OUTPATIENT
Start: 2023-02-07 | End: 2023-02-23

## 2023-02-09 ENCOUNTER — TRANSFERRED RECORDS (OUTPATIENT)
Dept: HEALTH INFORMATION MANAGEMENT | Facility: CLINIC | Age: 78
End: 2023-02-09

## 2023-02-21 DIAGNOSIS — E78.00 HYPERCHOLESTEREMIA: ICD-10-CM

## 2023-02-21 DIAGNOSIS — G31.84 MILD COGNITIVE IMPAIRMENT: ICD-10-CM

## 2023-02-22 NOTE — TELEPHONE ENCOUNTER
"Former patient of Karolina & has not established care with another provider.  Please assign refill request to covering provider per clinic standard process.    Last Written Prescription Date:  8/30/22  Last Fill Quantity: 90,  # refills: 3   Last office visit provider:  8/30/22     Requested Prescriptions   Pending Prescriptions Disp Refills     donepezil (ARICEPT) 5 MG tablet 30 tablet 0     Sig: Take 1 tablet (5 mg) by mouth At Bedtime       Miscellaneous Dementia Agents Passed - 2/21/2023 12:52 PM        Passed - Recent (12 mo) or future (30 days) visit within the authorizing provider's specialty     Patient has had an office visit with the authorizing provider or a provider within the authorizing providers department within the previous 12 mos or has a future within next 30 days. See \"Patient Info\" tab in inbasket, or \"Choose Columns\" in Meds & Orders section of the refill encounter.              Passed - Medication is active on med list        Passed - Patient is 18 years of age or older           atorvastatin (LIPITOR) 10 MG tablet 90 tablet 3     Sig: Take 1 tablet (10 mg) by mouth daily       Statins Protocol Passed - 2/21/2023 12:52 PM        Passed - LDL on file in past 12 months     Recent Labs   Lab Test 08/23/22  0814   LDL 87             Passed - No abnormal creatine kinase in past 12 months     No lab results found.             Passed - Recent (12 mo) or future (30 days) visit within the authorizing provider's specialty     Patient has had an office visit with the authorizing provider or a provider within the authorizing providers department within the previous 12 mos or has a future within next 30 days. See \"Patient Info\" tab in inbasket, or \"Choose Columns\" in Meds & Orders section of the refill encounter.              Passed - Medication is active on med list        Passed - Patient is age 18 or older        Passed - No active pregnancy on record        Passed - No positive pregnancy test in past 12 " Tamanna Fry, RN 02/22/23 5:21 PM

## 2023-02-23 RX ORDER — DONEPEZIL HYDROCHLORIDE 5 MG/1
5 TABLET, FILM COATED ORAL AT BEDTIME
Qty: 90 TABLET | Refills: 3 | Status: SHIPPED | OUTPATIENT
Start: 2023-02-23

## 2023-02-23 RX ORDER — ATORVASTATIN CALCIUM 10 MG/1
10 TABLET, FILM COATED ORAL DAILY
Qty: 90 TABLET | Refills: 3 | Status: SHIPPED | OUTPATIENT
Start: 2023-02-23

## 2023-02-23 NOTE — TELEPHONE ENCOUNTER
02/23 I called and discussed with pt she thought they had to wait till there physicals were up to schedule with a [facility], told her to schedule a pt est care visit in order to continue with meds we did approve this one time

## 2023-10-15 ENCOUNTER — HEALTH MAINTENANCE LETTER (OUTPATIENT)
Age: 78
End: 2023-10-15

## 2023-10-19 ENCOUNTER — ANCILLARY PROCEDURE (OUTPATIENT)
Dept: PET IMAGING | Facility: CLINIC | Age: 78
End: 2023-10-19
Attending: PSYCHIATRY & NEUROLOGY
Payer: COMMERCIAL

## 2023-10-19 DIAGNOSIS — G31.84 MILD COGNITIVE IMPAIRMENT: ICD-10-CM

## 2024-03-04 ENCOUNTER — TRANSFERRED RECORDS (OUTPATIENT)
Dept: HEALTH INFORMATION MANAGEMENT | Facility: CLINIC | Age: 79
End: 2024-03-04
Payer: COMMERCIAL

## 2024-07-21 ENCOUNTER — HEALTH MAINTENANCE LETTER (OUTPATIENT)
Age: 79
End: 2024-07-21

## 2025-01-18 ENCOUNTER — HEALTH MAINTENANCE LETTER (OUTPATIENT)
Age: 80
End: 2025-01-18

## 2025-08-10 ENCOUNTER — HEALTH MAINTENANCE LETTER (OUTPATIENT)
Age: 80
End: 2025-08-10